# Patient Record
Sex: FEMALE | Race: WHITE | URBAN - METROPOLITAN AREA
[De-identification: names, ages, dates, MRNs, and addresses within clinical notes are randomized per-mention and may not be internally consistent; named-entity substitution may affect disease eponyms.]

---

## 2024-03-20 NOTE — PROGRESS NOTES
"Assessment/Plan:     No problem-specific Assessment & Plan notes found for this encounter.          Diagnoses and all orders for this visit:    Positive pregnancy test  -     Ambulatory Referral to Maternal Fetal Medicine; Future  -     HIV 1/2 AG/AB w Reflex [facility] for 2 yr old and above; Future  -     Hepatitis C antibody; Future  -     UA (URINE) with reflex to Scope; Future  -     Urine culture; Future  -     Hepatitis B surface antigen; Future  -     CBC and differential; Future  -     Rubella antibody, IgG; Future  -     Type and screen; Future  -     RPR-Syphilis Screening (Total Syphilis IGG/IGM); Future  -     Hepatitis B surface antibody; Future  -     Anemia Panel w/Reflex, OB; Future  -     Prenatal Vit-Fe Fumarate-FA (Prenatal Plus Vitamin/Mineral) 27-1 MG TABS; Take 1 tablet by mouth in the morning    RTO for OB intake.          Subjective:      Patient ID: Marybel Rivas is a 29 y.o. female who presents for dating and viability US. Her LMP was 23, c/w 12 wk 3 days, EDC 10/04/24.  She offers no complaints.  She states her first child  at age 3 1/2 years but could not elucidate the reason.        TV US reveals a single viable IUP  CRL  4.33 cm c/w 11 wk 1 day    Dating per US  EDC 10/13/24    HPI    The following portions of the patient's history were reviewed and updated as appropriate: allergies, current medications, past family history, past medical history, past social history, past surgical history and problem list.    Review of Systems      Objective:      /68 (BP Location: Left arm, Patient Position: Sitting, Cuff Size: Adult)   Pulse 83   Resp 18   Ht 4' 10\" (1.473 m)   Wt 55.8 kg (123 lb)   LMP 2023 (Exact Date)   BMI 25.71 kg/m²          Physical Exam  Vitals and nursing note reviewed. Exam conducted with a chaperone present.   Constitutional:       Appearance: Normal appearance.   Pulmonary:      Effort: Pulmonary effort is normal.   Neurological:      " General: No focal deficit present.      Mental Status: She is alert and oriented to person, place, and time.   Psychiatric:         Mood and Affect: Mood normal.         Behavior: Behavior normal.

## 2024-03-25 ENCOUNTER — OFFICE VISIT (OUTPATIENT)
Dept: OBGYN CLINIC | Facility: CLINIC | Age: 29
End: 2024-03-25

## 2024-03-25 ENCOUNTER — APPOINTMENT (OUTPATIENT)
Dept: LAB | Facility: CLINIC | Age: 29
End: 2024-03-25

## 2024-03-25 ENCOUNTER — TELEPHONE (OUTPATIENT)
Age: 29
End: 2024-03-25

## 2024-03-25 VITALS
WEIGHT: 123 LBS | RESPIRATION RATE: 18 BRPM | DIASTOLIC BLOOD PRESSURE: 68 MMHG | SYSTOLIC BLOOD PRESSURE: 110 MMHG | HEART RATE: 83 BPM | BODY MASS INDEX: 25.82 KG/M2 | HEIGHT: 58 IN

## 2024-03-25 DIAGNOSIS — Z32.01 POSITIVE PREGNANCY TEST: Primary | ICD-10-CM

## 2024-03-25 DIAGNOSIS — Z32.01 POSITIVE PREGNANCY TEST: ICD-10-CM

## 2024-03-25 LAB
ABO GROUP BLD: NORMAL
AMORPH URATE CRY URNS QL MICRO: ABNORMAL
BACTERIA UR QL AUTO: ABNORMAL /HPF
BASOPHILS # BLD AUTO: 0.03 THOUSANDS/ÂΜL (ref 0–0.1)
BASOPHILS NFR BLD AUTO: 0 % (ref 0–1)
BILIRUB UR QL STRIP: NEGATIVE
BLD GP AB SCN SERPL QL: NEGATIVE
CLARITY UR: ABNORMAL
COLOR UR: YELLOW
EOSINOPHIL # BLD AUTO: 0.04 THOUSAND/ÂΜL (ref 0–0.61)
EOSINOPHIL NFR BLD AUTO: 1 % (ref 0–6)
ERYTHROCYTE [DISTWIDTH] IN BLOOD BY AUTOMATED COUNT: 12.5 % (ref 11.6–15.1)
GLUCOSE UR STRIP-MCNC: NEGATIVE MG/DL
HBV SURFACE AB SER-ACNC: 110 MIU/ML
HBV SURFACE AG SER QL: NORMAL
HCT VFR BLD AUTO: 36.3 % (ref 34.8–46.1)
HCV AB SER QL: NORMAL
HGB BLD-MCNC: 12.2 G/DL (ref 11.5–15.4)
HGB UR QL STRIP.AUTO: NEGATIVE
HIV 1+2 AB+HIV1 P24 AG SERPL QL IA: NORMAL
HIV 2 AB SERPL QL IA: NORMAL
HIV1 AB SERPL QL IA: NORMAL
HIV1 P24 AG SERPL QL IA: NORMAL
IMM GRANULOCYTES # BLD AUTO: 0.03 THOUSAND/UL (ref 0–0.2)
IMM GRANULOCYTES NFR BLD AUTO: 0 % (ref 0–2)
KETONES UR STRIP-MCNC: NEGATIVE MG/DL
LEUKOCYTE ESTERASE UR QL STRIP: ABNORMAL
LYMPHOCYTES # BLD AUTO: 1.8 THOUSANDS/ÂΜL (ref 0.6–4.47)
LYMPHOCYTES NFR BLD AUTO: 22 % (ref 14–44)
MCH RBC QN AUTO: 28.8 PG (ref 26.8–34.3)
MCHC RBC AUTO-ENTMCNC: 33.6 G/DL (ref 31.4–37.4)
MCV RBC AUTO: 86 FL (ref 82–98)
MONOCYTES # BLD AUTO: 0.53 THOUSAND/ÂΜL (ref 0.17–1.22)
MONOCYTES NFR BLD AUTO: 6 % (ref 4–12)
MUCOUS THREADS UR QL AUTO: ABNORMAL
NEUTROPHILS # BLD AUTO: 5.86 THOUSANDS/ÂΜL (ref 1.85–7.62)
NEUTS SEG NFR BLD AUTO: 71 % (ref 43–75)
NITRITE UR QL STRIP: POSITIVE
NON-SQ EPI CELLS URNS QL MICRO: ABNORMAL /HPF
NRBC BLD AUTO-RTO: 0 /100 WBCS
PH UR STRIP.AUTO: 7.5 [PH]
PLATELET # BLD AUTO: 317 THOUSANDS/UL (ref 149–390)
PMV BLD AUTO: 11.1 FL (ref 8.9–12.7)
PROT UR STRIP-MCNC: ABNORMAL MG/DL
RBC # BLD AUTO: 4.24 MILLION/UL (ref 3.81–5.12)
RBC #/AREA URNS AUTO: ABNORMAL /HPF
RH BLD: POSITIVE
RUBV IGG SERPL IA-ACNC: 26.3 IU/ML
SP GR UR STRIP.AUTO: 1.02 (ref 1–1.03)
SPECIMEN EXPIRATION DATE: NORMAL
TREPONEMA PALLIDUM IGG+IGM AB [PRESENCE] IN SERUM OR PLASMA BY IMMUNOASSAY: NORMAL
UROBILINOGEN UR STRIP-ACNC: <2 MG/DL
WBC # BLD AUTO: 8.29 THOUSAND/UL (ref 4.31–10.16)
WBC #/AREA URNS AUTO: ABNORMAL /HPF

## 2024-03-25 PROCEDURE — 87340 HEPATITIS B SURFACE AG IA: CPT

## 2024-03-25 PROCEDURE — 99202 OFFICE O/P NEW SF 15 MIN: CPT | Performed by: OBSTETRICS & GYNECOLOGY

## 2024-03-25 PROCEDURE — 86900 BLOOD TYPING SEROLOGIC ABO: CPT

## 2024-03-25 PROCEDURE — 85025 COMPLETE CBC W/AUTO DIFF WBC: CPT

## 2024-03-25 PROCEDURE — 86803 HEPATITIS C AB TEST: CPT

## 2024-03-25 PROCEDURE — 86762 RUBELLA ANTIBODY: CPT

## 2024-03-25 PROCEDURE — 81001 URINALYSIS AUTO W/SCOPE: CPT

## 2024-03-25 PROCEDURE — 86780 TREPONEMA PALLIDUM: CPT

## 2024-03-25 PROCEDURE — 76817 TRANSVAGINAL US OBSTETRIC: CPT | Performed by: OBSTETRICS & GYNECOLOGY

## 2024-03-25 PROCEDURE — 86850 RBC ANTIBODY SCREEN: CPT

## 2024-03-25 PROCEDURE — 86706 HEP B SURFACE ANTIBODY: CPT

## 2024-03-25 PROCEDURE — 87389 HIV-1 AG W/HIV-1&-2 AB AG IA: CPT

## 2024-03-25 PROCEDURE — 86901 BLOOD TYPING SEROLOGIC RH(D): CPT

## 2024-03-25 PROCEDURE — 36415 COLL VENOUS BLD VENIPUNCTURE: CPT

## 2024-03-25 RX ORDER — VITAMIN A ACETATE, .BETA.-CAROTENE, ASCORBIC ACID, CHOLECALCIFEROL, .ALPHA.-TOCOPHEROL ACETATE, DL-, THIAMINE MONONITRATE, RIBOFLAVIN, NIACINAMIDE, PYRIDOXINE HYDROCHLORIDE, FOLIC ACID, CYANOCOBALAMIN, CALCIUM CARBONATE, FERROUS FUMARATE, ZINC OXIDE, CUPRIC OXIDE 9.9; 120; 920; 200; 400; 2; 12; 27; 1; 20; 10; 3; 1.84; 3080; 25 MG/1; MG/1; [IU]/1; MG/1; [IU]/1; MG/1; UG/1; MG/1; MG/1; MG/1; MG/1; MG/1; MG/1; [IU]/1; MG/1
1 TABLET, FILM COATED ORAL DAILY
Qty: 90 TABLET | Refills: 3 | Status: SHIPPED | OUTPATIENT
Start: 2024-03-25

## 2024-03-28 ENCOUNTER — INITIAL PRENATAL (OUTPATIENT)
Dept: OBGYN CLINIC | Facility: CLINIC | Age: 29
End: 2024-03-28

## 2024-03-28 DIAGNOSIS — Z34.91 PRENATAL CARE IN FIRST TRIMESTER: Primary | ICD-10-CM

## 2024-03-28 PROCEDURE — 99211 OFF/OP EST MAY X REQ PHY/QHP: CPT

## 2024-03-28 NOTE — LETTER
[facility] Letter    Marybel Rivas  1995  [address]  [place] [address]       03/28/24          Marybel Rivas is a patient and under our care in our office. Marybel Rivas's Estimated Date of Delivery: 10/13/24.  Any questions or concerns feel free to contact our office.     Thank you,    Critical access hospital OB/GYN      [facility]/[place]  [phone]  [facility]/[place]  [phone]    [facility]/[place]  [phone]   [facility]  [phone]    [facility]   [phone]    [facility]  [phone]

## 2024-03-28 NOTE — LETTER
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    We recommend that all pregnant women:    1. Have a well-ventilated workspace.  2. Wear low-heeled shoes.  3. Work no more than 40 hours per week.  4. Have a 15 minute break every 2 hours and at least 30 minutes for a meal break.   5. Use good body mechanics by bending at your knees to avoid back strain and lift no more than 20 pounds without assistance. Will need assistance with lifting over 20 lbs.   6. Have ready access to bathrooms and water.      She may continue to work until her due date unless medical complications arise. We anticipate she may return to work in 6-8 weeks after delivery.     Sincerely,    [facility]  [address] [place] [address] [phone]

## 2024-03-28 NOTE — LETTER
Proof of Pregnancy Letter    Marybel Rivas  1995  [address]  [place] [address]        03/28/24      Marybel Rivas is a patient at our facility. Marybel Rivas Estimated Date of Delivery: 10/13/24       Any questions or concerns, please feel free to contact our office.    Sincerely,     [facility]   [address], [place] [address] [phone]

## 2024-03-28 NOTE — PROGRESS NOTES
OB INTAKE INTERVIEW  Pt presents for OB intake.     OB History    Para Term  AB Living   2 1 1     0   SAB IAB Ectopic Multiple Live Births           1      # Outcome Date GA Lbr Danny/2nd Weight Sex Delivery Anes PTL Lv   2 Current            1 Term     F Vag-Spont   DEC     Hx of  delivery prior to 36 weeks 6 days:  No   If yes, place a referral for cervical surveillance at 16 weeks.     Last Menstrual Period:    Patient's last menstrual period was 2023 (exact date).     Ultrasound date: 2024  11 weeks 4 days     Estimated Date of Delivery: 10/13/24   Confirmed by LMP or US    H&P visit scheduled. 2024      Last pap smear: unknown date    Findings; lab pap smear results: no abnormalities    Current Issues:  Constipation :   No  Headaches :   No  Cramping:  No  Spotting :   No  PICA cravings :  No    FOB Involved:   Yes  Planned pregnancy:  Yes    I have these concerns about this prenatal patient:   Intake done with use of [employer]  line.  Patient referral in for MFM but patient has not yet scheduled.  Encouraged to complete. Patient had previous daughter at age 4 that is  but patient reports her death was 'natural' and unable to give reason.    Interview education    Baby and Me Handout  [facility] Handout  [facility] Handout  Discussed genetic testing  Prenatal lab work: Scripts printed and given to pt.   Influenza vaccine given today: No  Discussed Tdap vaccine.   Immunizations:   There is no immunization history on file for this patient.  Depression Screening Follow-up Plan: Patient's depression screening was negative with an Hagerman score of  6  Clinically patient does not have depression. No treatment is required..          Infection Screening: Does the pt have a hx of MRSA? No  If yes- please follow MRSA protocol and obtain a nasal swab for MRSA culture    The patient was oriented to our practice and all questions were  answered.  Interviewed by: Laura Hough, KO 03/28/24

## 2024-04-04 ENCOUNTER — PATIENT OUTREACH (OUTPATIENT)
Dept: OBGYN CLINIC | Facility: CLINIC | Age: 29
End: 2024-04-04

## 2024-04-04 ENCOUNTER — ROUTINE PRENATAL (OUTPATIENT)
Dept: OBGYN CLINIC | Facility: CLINIC | Age: 29
End: 2024-04-04

## 2024-04-04 VITALS
HEIGHT: 58 IN | BODY MASS INDEX: 26.03 KG/M2 | RESPIRATION RATE: 18 BRPM | SYSTOLIC BLOOD PRESSURE: 104 MMHG | HEART RATE: 88 BPM | WEIGHT: 124 LBS | DIASTOLIC BLOOD PRESSURE: 71 MMHG

## 2024-04-04 DIAGNOSIS — O21.9 NAUSEA AND VOMITING IN PREGNANCY: ICD-10-CM

## 2024-04-04 DIAGNOSIS — Z20.2 POSSIBLE EXPOSURE TO STD: ICD-10-CM

## 2024-04-04 DIAGNOSIS — Z12.4 ENCOUNTER FOR PAPANICOLAOU SMEAR OF CERVIX: Primary | ICD-10-CM

## 2024-04-04 PROBLEM — Z3A.12 12 WEEKS GESTATION OF PREGNANCY: Status: ACTIVE | Noted: 2024-04-04

## 2024-04-04 PROCEDURE — 87591 N.GONORRHOEAE DNA AMP PROB: CPT

## 2024-04-04 PROCEDURE — 87491 CHLMYD TRACH DNA AMP PROBE: CPT

## 2024-04-04 PROCEDURE — 99213 OFFICE O/P EST LOW 20 MIN: CPT | Performed by: OBSTETRICS & GYNECOLOGY

## 2024-04-04 PROCEDURE — G0145 SCR C/V CYTO,THINLAYER,RESCR: HCPCS

## 2024-04-04 RX ORDER — PYRIDOXINE HCL (VITAMIN B6) 25 MG
25 TABLET ORAL DAILY
Qty: 30 TABLET | Refills: 0 | Status: SHIPPED | OUTPATIENT
Start: 2024-04-04 | End: 2024-05-04

## 2024-04-04 NOTE — PROGRESS NOTES
OB/GYN  PRENATAL H&P VISIT  Marybel Rivas  2024  3:06 PM  Dr. Latisha Sadler MD      SUBJECTIVE  Marybel Rivas is a 29 y.o.  at 12w4d here for initial prenatal H&P.     She is currently doing well. She denies vaginal bleeding, cramping, leakage, abnormal discharge.   Patient does endorse some nausea and heart burn. We discussed the use of      Past Medical History:  - Denies    Past Surgical History:  - denies    Social History:  - Partner's name: Wali, involved: yes  - She does not currently work.   - She denies hx of STD/STI, denies a hx of TB or close contacts with persons with TB. She has never had MRSA.   - She denies use of nicotine or recreational drug use. She denies use of ETOH.    Family History:  - She denies a family history of inheritable conditions such as physical or intellectual disabilities, birth defects, blood disorders, heart or neural tube defects.     OB History    Para Term  AB Living   2 1 1 0 0 0   SAB IAB Ectopic Multiple Live Births   0 0 0 0 1      # Outcome Date GA Lbr Danny/2nd Weight Sex Delivery Anes PTL Lv   2 Current            1 Term 2012    F Vag-Spont   DEC         History reviewed. No pertinent past medical history.    History reviewed. No pertinent surgical history.    Social History     Socioeconomic History    Marital status: Legally      Spouse name: Not on file    Number of children: Not on file    Years of education: Not on file    Highest education level: Not on file   Occupational History    Not on file   Tobacco Use    Smoking status: Never    Smokeless tobacco: Never   Vaping Use    Vaping status: Never Used   Substance and Sexual Activity    Alcohol use: Not Currently    Drug use: Never    Sexual activity: Yes     Partners: Male     Birth control/protection: None   Other Topics Concern    Not on file   Social History Narrative    Not on file     Social Determinants of Health     Financial Resource Strain: Low Risk  (3/28/2024)     "Overall Financial Resource Strain (CARDIA)     Difficulty of Paying Living Expenses: Not very hard   Food Insecurity: No Food Insecurity (3/28/2024)    Hunger Vital Sign     Worried About Running Out of Food in the Last Year: Never true     Ran Out of Food in the Last Year: Never true   Transportation Needs: No Transportation Needs (3/28/2024)    PRAPARE - Transportation     Lack of Transportation (Medical): No     Lack of Transportation (Non-Medical): No   Physical Activity: Not on file   Stress: Not on file   Social Connections: Not on file   Intimate Partner Violence: Not At Risk (3/28/2024)    Humiliation, Afraid, Rape, and Kick questionnaire     Fear of Current or Ex-Partner: No     Emotionally Abused: No     Physically Abused: No     Sexually Abused: No   Housing Stability: Low Risk  (3/28/2024)    Housing Stability Vital Sign     Unable to Pay for Housing in the Last Year: No     Number of Places Lived in the Last Year: 1     Unstable Housing in the Last Year: No       OBJECTIVE  Vitals:    24 1349   BP: 104/71   Pulse: 88   Resp: 18       General: Well appearing, no distress  Respiratory: Unlabored breathing  Cardiovascular: Regular rate.  Abdomen: Soft, gravid, nontender  Fundal Height: Appropriate for gestational age.  Extremities: Warm and well perfused.  Non tender.    ASSESSMENT AND PLAN    29 y.o., , with /71 (BP Location: Left arm, Patient Position: Sitting, Cuff Size: Adult)   Pulse 88   Resp 18   Ht 4' 10\" (1.473 m)   Wt 56.2 kg (124 lb)   LMP 2023 (Exact Date) , at 12w4d here for her prenatal H&P.    Pregnancy: H&P completed today. PN Labs reviewed today.  Labor expectations discussed with patient, including appointment schedule, nutrition, exercise, medications, sexual intercourse, and nausea/vomiting.     FHR: 152bpm by doppler    Overview:    Labs  Pap smear collected today  and GC/Ct collected today  Prenatal panelreviewed and wnl  28w labs to be " ordered  Vaccines:    Tdap vaccine: offer at 28w  Contraception: undecided  Feeding plan: breast  Birth plan:  possibly with epidural  Delivery consent: to be signed at 28w  Ultrasounds: Level 1 scheduled for 4/10    Screening: Pap smear . GC/CT collected.   Problem List          Digestive    Nausea and vomiting in pregnancy    Current Assessment & Plan     Patient endorses occasional nausea and acid reflux  Patient encouraged to try TUMS and B6 sent to pharmacy            Obstetrics/Gynecology    12 weeks gestation of pregnancy    Overview     Labs  Pap smear collected today  and GC/Ct collected today  Prenatal panelreviewed and wnl  28w labs to be ordered  Vaccines:    Tdap vaccine: offer at 28w  Contraception: undecided  Feeding plan: breast  Birth plan:  possibly with epidural  Delivery consent: to be signed at 28w  Ultrasounds: Level 1 scheduled for 4/10         Current Assessment & Plan     Pap/ GC/CT collected today  RTO in 4 weeks          Other Visit Diagnoses       Encounter for Papanicolaou smear of cervix    -  Primary    Possible exposure to STD                Latisha Sadler MD  2024  3:06 PM

## 2024-04-04 NOTE — ASSESSMENT & PLAN NOTE
Patient endorses occasional nausea and acid reflux  Patient encouraged to try TUMS and B6 sent to pharmacy

## 2024-04-04 NOTE — PROGRESS NOTES
RICHELLE RIOS was referred to complete a PN SW assessment. Pt is 29 y.o.  single  female. Pt GA is 12w4d and  Estimated Date of Delivery: 10/13/24. Pt primary language is Egyptian.     Pt reports that this was an unplanned but welcomed pregnancy. PT reports that she is happy. PT reports that FOB is supportive and that he is happy. PT is here with her close friend who is also supportive.     PT denies MH concerns at this time. PT denies JAIN concerns.     PT denied food insecurity but did reports that she has been receiving food assistance with a [employer].    PT reports concern for baby supplies. PT requested to receive supply resources at a later time in pregnancy.     RICHELLE RIOS has placed PT under SW surveillance and will f/u with PT at June visit.     RICHELLE RIOS spoke with Provider of PT. Provider informed RICHELLE RIOS that PT lost a child when the child was just 5yo.

## 2024-04-06 LAB
C TRACH DNA SPEC QL NAA+PROBE: NEGATIVE
N GONORRHOEA DNA SPEC QL NAA+PROBE: NEGATIVE

## 2024-04-10 ENCOUNTER — ROUTINE PRENATAL (OUTPATIENT)
Facility: HOSPITAL | Age: 29
End: 2024-04-10
Attending: OBSTETRICS & GYNECOLOGY

## 2024-04-10 VITALS
HEART RATE: 87 BPM | HEIGHT: 58 IN | SYSTOLIC BLOOD PRESSURE: 112 MMHG | DIASTOLIC BLOOD PRESSURE: 64 MMHG | WEIGHT: 126.1 LBS | BODY MASS INDEX: 26.47 KG/M2

## 2024-04-10 DIAGNOSIS — Z3A.13 13 WEEKS GESTATION OF PREGNANCY: ICD-10-CM

## 2024-04-10 DIAGNOSIS — Z36.82 ENCOUNTER FOR ANTENATAL SCREENING FOR NUCHAL TRANSLUCENCY: Primary | ICD-10-CM

## 2024-04-10 DIAGNOSIS — Z32.01 POSITIVE PREGNANCY TEST: ICD-10-CM

## 2024-04-10 PROCEDURE — 76801 OB US < 14 WKS SINGLE FETUS: CPT | Performed by: OBSTETRICS & GYNECOLOGY

## 2024-04-10 PROCEDURE — 76813 OB US NUCHAL MEAS 1 GEST: CPT | Performed by: OBSTETRICS & GYNECOLOGY

## 2024-04-10 RX ORDER — ASPIRIN 81 MG/1
162 TABLET, CHEWABLE ORAL
Qty: 180 TABLET | Refills: 1 | Status: SHIPPED | OUTPATIENT
Start: 2024-04-10 | End: 2024-07-09

## 2024-04-10 NOTE — LETTER
April 10, 2024     [facility] PROVIDER    Patient: Marybel Rivas   YOB: 1995   Date of Visit: 4/10/2024       Dear  Provider:    Thank you for referring Marybel Rivas to me for evaluation. Below are my notes for this consultation.    If you have questions, please do not hesitate to call me. I look forward to following your patient along with you.         Sincerely,        Donaldo Mejia MD        CC: No Recipients    Donaldo Mejia MD  4/10/2024  8:20 AM  Sign when Signing Visit  Please refer to the [facility] ultrasound report in Ob Procedures for additional information regarding today's visit

## 2024-04-10 NOTE — PROGRESS NOTES
Please refer to the [facility] ultrasound report in Ob Procedures for additional information regarding today's visit

## 2024-04-11 LAB
LAB AP GYN PRIMARY INTERPRETATION: NORMAL
LAB AP LMP: NORMAL
Lab: NORMAL

## 2024-04-17 ENCOUNTER — TELEPHONE (OUTPATIENT)
Dept: OBGYN CLINIC | Facility: CLINIC | Age: 29
End: 2024-04-17

## 2024-04-17 NOTE — TELEPHONE ENCOUNTER
ID [id number]    Called and spoke to patient, informed her of the test results. Patient verbalized understanding, no questions or concerns.

## 2024-04-17 NOTE — TELEPHONE ENCOUNTER
----- Message from Latisha Sadler MD sent at 4/17/2024  3:57 PM EDT -----  Hi, can someone please give Cherelle a call and let her know the testing we did was all negative/normal.   Thanks!  -Janae

## 2024-05-02 ENCOUNTER — ROUTINE PRENATAL (OUTPATIENT)
Dept: OBGYN CLINIC | Facility: CLINIC | Age: 29
End: 2024-05-02

## 2024-05-02 VITALS
HEART RATE: 72 BPM | SYSTOLIC BLOOD PRESSURE: 97 MMHG | BODY MASS INDEX: 26.24 KG/M2 | WEIGHT: 125 LBS | RESPIRATION RATE: 18 BRPM | DIASTOLIC BLOOD PRESSURE: 62 MMHG | HEIGHT: 58 IN

## 2024-05-02 DIAGNOSIS — Z60.3 LANGUAGE BARRIER: ICD-10-CM

## 2024-05-02 DIAGNOSIS — Z34.92 SECOND TRIMESTER PREGNANCY: Primary | ICD-10-CM

## 2024-05-02 DIAGNOSIS — Z75.8 LANGUAGE BARRIER: ICD-10-CM

## 2024-05-02 DIAGNOSIS — Z3A.16 16 WEEKS GESTATION OF PREGNANCY: ICD-10-CM

## 2024-05-02 PROCEDURE — 99213 OFFICE O/P EST LOW 20 MIN: CPT | Performed by: NURSE PRACTITIONER

## 2024-05-02 SDOH — SOCIAL STABILITY - SOCIAL INSECURITY: ACCULTURATION DIFFICULTY: Z60.3

## 2024-05-02 NOTE — PROGRESS NOTES
[facility]  OB/GYN prenatal visit    S: 29 y.o.  16w4d here for PN visit. She has no obstetric complaints, including pelvic pain, contractions, vaginal bleeding, loss of fluid,  used  [id number]  O:  Vitals:    24 1446   BP: 97/62   Pulse: 72   Resp: 18       Gen: no acute distress, nonlabored breathing  Fundal Height (cm): 16 cm  Fetal Heart Rate: 158    A/P:      IUP at 16w4d  No obstetric complaints today  US 4/10/24 , level 2 scheduled for 24  LDASA 162 mg daily till 36 weeks, first-degree relative with preeclampsia, long interval pregnancyindication  MSAFP - reviewed pt desires  Contraception: undecided  Breastfeeding: y  Discussed  labor precautions and fetal kick counts    Return to clinic in 4 weeks    Problem List          Digestive    Nausea and vomiting in pregnancy       Care Coordination    Language barrier       Obstetrics/Gynecology    16 weeks gestation of pregnancy    Overview     Labs  Pap smear collected today- negative   and GC/Ct -negative  Prenatal panelreviewed and wnl  28w labs to be ordered  LDASA 2 mg daily until 36 weeks, first-degree relative with preeclampsia, long interval pregnancy indication  Declined NIPT  MSAFP [ pt to complete ]   Vaccines: flu vaccine- no  Covid vaccines- none  Tdap vaccine: offer at 28w  Contraception: undecided  Feeding plan: breast  Birth plan:  possibly with epidural  Delivery consent: to be signed at 28w  Ultrasounds: Level 1 scheduled for 4/10/24, level 2 on  24         Second trimester pregnancy          ADIEL Barcenas  2024  3:03 PM

## 2024-05-16 ENCOUNTER — APPOINTMENT (OUTPATIENT)
Dept: LAB | Facility: CLINIC | Age: 29
End: 2024-05-16

## 2024-05-16 DIAGNOSIS — Z34.92 SECOND TRIMESTER PREGNANCY: ICD-10-CM

## 2024-05-16 DIAGNOSIS — Z3A.16 16 WEEKS GESTATION OF PREGNANCY: ICD-10-CM

## 2024-05-16 PROCEDURE — 82105 ALPHA-FETOPROTEIN SERUM: CPT

## 2024-05-16 PROCEDURE — 36415 COLL VENOUS BLD VENIPUNCTURE: CPT

## 2024-05-18 LAB
2ND TRIMESTER 4 SCREEN SERPL-IMP: NORMAL
AFP ADJ MOM SERPL: 0.93
AFP INTERP AMN-IMP: NORMAL
AFP INTERP SERPL-IMP: NORMAL
AFP INTERP SERPL-IMP: NORMAL
AFP SERPL-MCNC: 50 NG/ML
AGE AT DELIVERY: 28.5 YR
GA METHOD: NORMAL
GA: 18.6 WEEKS
IDDM PATIENT QL: NO
MULTIPLE PREGNANCY: NO
NEURAL TUBE DEFECT RISK FETUS: NORMAL %

## 2024-05-20 ENCOUNTER — TELEPHONE (OUTPATIENT)
Dept: OBGYN CLINIC | Facility: CLINIC | Age: 29
End: 2024-05-20

## 2024-05-20 NOTE — TELEPHONE ENCOUNTER
----- Message from ADIEL Xie sent at 5/20/2024  5:41 PM EDT -----  Please call patient to inform that her MSAFP test, screen for spina bifida/neural tube defects was negative.  Thank you

## 2024-05-30 ENCOUNTER — ROUTINE PRENATAL (OUTPATIENT)
Facility: HOSPITAL | Age: 29
End: 2024-05-30

## 2024-05-30 ENCOUNTER — ROUTINE PRENATAL (OUTPATIENT)
Dept: OBGYN CLINIC | Facility: CLINIC | Age: 29
End: 2024-05-30

## 2024-05-30 VITALS
DIASTOLIC BLOOD PRESSURE: 58 MMHG | SYSTOLIC BLOOD PRESSURE: 108 MMHG | BODY MASS INDEX: 26.53 KG/M2 | HEIGHT: 58 IN | HEART RATE: 112 BPM | WEIGHT: 126.4 LBS

## 2024-05-30 VITALS
RESPIRATION RATE: 16 BRPM | BODY MASS INDEX: 26.5 KG/M2 | WEIGHT: 126.8 LBS | DIASTOLIC BLOOD PRESSURE: 62 MMHG | HEART RATE: 100 BPM | SYSTOLIC BLOOD PRESSURE: 100 MMHG

## 2024-05-30 DIAGNOSIS — Z34.92 SECOND TRIMESTER PREGNANCY: ICD-10-CM

## 2024-05-30 DIAGNOSIS — Z36.3 ENCOUNTER FOR ANTENATAL SCREENING FOR MALFORMATIONS: ICD-10-CM

## 2024-05-30 DIAGNOSIS — Z3A.20 20 WEEKS GESTATION OF PREGNANCY: Primary | ICD-10-CM

## 2024-05-30 DIAGNOSIS — Z36.86 ENCOUNTER FOR ANTENATAL SCREENING FOR CERVICAL LENGTH: ICD-10-CM

## 2024-05-30 DIAGNOSIS — Z3A.13 13 WEEKS GESTATION OF PREGNANCY: ICD-10-CM

## 2024-05-30 PROBLEM — O21.9 NAUSEA AND VOMITING IN PREGNANCY: Status: RESOLVED | Noted: 2024-04-04 | Resolved: 2024-05-30

## 2024-05-30 PROCEDURE — 76817 TRANSVAGINAL US OBSTETRIC: CPT | Performed by: OBSTETRICS & GYNECOLOGY

## 2024-05-30 PROCEDURE — 76805 OB US >/= 14 WKS SNGL FETUS: CPT | Performed by: OBSTETRICS & GYNECOLOGY

## 2024-05-30 PROCEDURE — PNV: Performed by: OBSTETRICS & GYNECOLOGY

## 2024-05-30 RX ORDER — ASPIRIN 81 MG/1
162 TABLET, CHEWABLE ORAL
Qty: 180 TABLET | Refills: 1 | Status: SHIPPED | OUTPATIENT
Start: 2024-05-30 | End: 2024-11-26

## 2024-05-30 NOTE — PROGRESS NOTES
[facility] OBGYN  PRENATAL VISIT  Name: Marybel Rivas  MRN: [id number]  : 1995      ASSESSMENT/PLAN:  Problem List       20 weeks gestation of pregnancy    Overview     Labs  Pap smear collected today- negative   and GC/Ct -negative  Prenatal panelreviewed and wnl  28w labs to be ordered  LDASA 2 mg daily until 36 weeks, first-degree relative with preeclampsia, long interval pregnancy indication  Declined NIPT  MSAFP neg  Vaccines: flu vaccine- no  Covid vaccines- none  Tdap vaccine: offer at 28w  Contraception: undecided  Feeding plan: breast  Birth plan:  possibly with epidural  Delivery consent: to be signed at 28w  Ultrasounds:level II scheduled for 24         Second trimester pregnancy    Language barrier     Other Visit Diagnoses       13 weeks gestation of pregnancy                  SUBJECTIVE 29 y.o.  20w4d here for PN visit. She denies contractions. She denies leakage of fluid and vaginal bleeding.     OBJECTIVE:  Vitals:    24 0806   BP: 100/62   Pulse: 100   Resp: 16       Physical Exam    FHT: 150 bpm       Future Appointments   Date Time Provider Department Center   2024 10:45 AM  US 1 [facility]         Liana Bhakta MD  OB/GYN PGY-4  2024  8:18 AM

## 2024-05-30 NOTE — LETTER
May 30, 2024     [facility] PROVIDER    Patient: Marybel Rivas   YOB: 1995   Date of Visit: 5/30/2024       Dear  Provider:    Thank you for referring Marybel Rivas to me for evaluation. Below are my notes for this consultation.    If you have questions, please do not hesitate to call me. I look forward to following your patient along with you.         Sincerely,        Donaldo Mejia MD        CC: No Recipients    Donaldo Mejia MD  5/30/2024 10:57 AM  Sign when Signing Visit  Please refer to the [facility] ultrasound report in Ob Procedures for additional information regarding today's visit

## 2024-06-27 ENCOUNTER — ROUTINE PRENATAL (OUTPATIENT)
Dept: OBGYN CLINIC | Facility: CLINIC | Age: 29
End: 2024-06-27

## 2024-06-27 ENCOUNTER — PATIENT OUTREACH (OUTPATIENT)
Dept: OBGYN CLINIC | Facility: CLINIC | Age: 29
End: 2024-06-27

## 2024-06-27 VITALS
RESPIRATION RATE: 18 BRPM | HEIGHT: 58 IN | WEIGHT: 128 LBS | DIASTOLIC BLOOD PRESSURE: 65 MMHG | SYSTOLIC BLOOD PRESSURE: 101 MMHG | HEART RATE: 87 BPM | BODY MASS INDEX: 26.87 KG/M2

## 2024-06-27 DIAGNOSIS — Z75.8 LANGUAGE BARRIER: ICD-10-CM

## 2024-06-27 DIAGNOSIS — Z34.92 SECOND TRIMESTER PREGNANCY: Primary | ICD-10-CM

## 2024-06-27 DIAGNOSIS — Z3A.24 24 WEEKS GESTATION OF PREGNANCY: ICD-10-CM

## 2024-06-27 DIAGNOSIS — Z60.3 LANGUAGE BARRIER: ICD-10-CM

## 2024-06-27 PROCEDURE — 99213 OFFICE O/P EST LOW 20 MIN: CPT | Performed by: NURSE PRACTITIONER

## 2024-06-27 SDOH — SOCIAL STABILITY - SOCIAL INSECURITY: ACCULTURATION DIFFICULTY: Z60.3

## 2024-06-27 NOTE — PROGRESS NOTES
[facility]  OB/GYN prenatal visit    S: 29 y.o.  24w4d here for PN visit. She has no obstetric complaints, including pelvic pain, contractions, vaginal bleeding, loss of fluid, or decreased fetal movement.    [id number] used  O:  Vitals:    24 0826   BP: 101/65   Pulse: 87   Resp: 18       Gen: no acute distress, nonlabored breathing  Fundal Height (cm): 24 cm  Fetal Heart Rate: 155    A/P:      IUP at 24w4d  No obstetric complaints today   MSAFP neg,   US 24 growth normal, no further ultrasound  To get her 28-week labs done a few days prior to her next appointment in 4 weeks, O+.  Taking her LDASA daily  Contraception: Nexplanon reviewed with pt  Breastfeeding: Y  Birth plan:    Discussed  labor precautions and fetal kick counts    Return to clinic in 4 weeks    Problem List          Care Coordination    Language barrier       Obstetrics/Gynecology    24 weeks gestation of pregnancy    Overview     Labs  Pap smear collected today- negative   and GC/Ct -negative  Prenatal panelreviewed and wnl  28w labs to be ordered  LDASA 2 mg daily until 36 weeks, first-degree relative with preeclampsia, long interval pregnancy indication  Declined NIPT  MSAFP neg  Vaccines: flu vaccine- no  Covid vaccines- none  Tdap vaccine: offer at 28w  Contraception: undecided  Feeding plan: breast  Birth plan:  possibly with epidural  Delivery consent: to be signed at 28w  Ultrasounds:level II scheduled for 24         Second trimester pregnancy              ADIEL Barcenas  2024  8:39 AM

## 2024-06-27 NOTE — PROGRESS NOTES
RICHELLE RIOS met with pt to f/u using  services ID [id number].     Pt reports that she is having a baby girl and is still interested in supply resources. RICHELLE RIOS filled out referral for crib donation and [facility] for supplies. RICHELLE RIOS will f/u with any additional application needed.     RICHELLE RIOS f/u regarding pt MH. Pt became emotional by getting teary eyes and slow pace talking. Pt expressed history of child loss over 10 years ago. Pt reports this is her first pregnancy since the loss of her first daughter in [place]. RICHELLE RIOS provided supportive listening. RICHELLE RIOS encouraged pt to contact RICHELLE RIOS for additional emotional support to pt if needed. RICHELLE RIOS provided contact card.     IRCHELLE RIOS will f/u with pt at next visit.

## 2024-07-23 ENCOUNTER — APPOINTMENT (OUTPATIENT)
Dept: LAB | Facility: CLINIC | Age: 29
End: 2024-07-23

## 2024-07-23 DIAGNOSIS — Z3A.24 24 WEEKS GESTATION OF PREGNANCY: ICD-10-CM

## 2024-07-23 LAB
BASOPHILS # BLD AUTO: 0.04 THOUSANDS/ÂΜL (ref 0–0.1)
BASOPHILS NFR BLD AUTO: 1 % (ref 0–1)
EOSINOPHIL # BLD AUTO: 0.03 THOUSAND/ÂΜL (ref 0–0.61)
EOSINOPHIL NFR BLD AUTO: 0 % (ref 0–6)
ERYTHROCYTE [DISTWIDTH] IN BLOOD BY AUTOMATED COUNT: 13.1 % (ref 11.6–15.1)
GLUCOSE 1H P 50 G GLC PO SERPL-MCNC: 151 MG/DL (ref 70–134)
HCT VFR BLD AUTO: 34.2 % (ref 34.8–46.1)
HGB BLD-MCNC: 11 G/DL (ref 11.5–15.4)
IMM GRANULOCYTES # BLD AUTO: 0.1 THOUSAND/UL (ref 0–0.2)
IMM GRANULOCYTES NFR BLD AUTO: 1 % (ref 0–2)
LYMPHOCYTES # BLD AUTO: 1.86 THOUSANDS/ÂΜL (ref 0.6–4.47)
LYMPHOCYTES NFR BLD AUTO: 25 % (ref 14–44)
MCH RBC QN AUTO: 28.1 PG (ref 26.8–34.3)
MCHC RBC AUTO-ENTMCNC: 32.2 G/DL (ref 31.4–37.4)
MCV RBC AUTO: 87 FL (ref 82–98)
MONOCYTES # BLD AUTO: 0.39 THOUSAND/ÂΜL (ref 0.17–1.22)
MONOCYTES NFR BLD AUTO: 5 % (ref 4–12)
NEUTROPHILS # BLD AUTO: 5.17 THOUSANDS/ÂΜL (ref 1.85–7.62)
NEUTS SEG NFR BLD AUTO: 68 % (ref 43–75)
NRBC BLD AUTO-RTO: 0 /100 WBCS
PLATELET # BLD AUTO: 258 THOUSANDS/UL (ref 149–390)
PMV BLD AUTO: 11.2 FL (ref 8.9–12.7)
RBC # BLD AUTO: 3.92 MILLION/UL (ref 3.81–5.12)
TREPONEMA PALLIDUM IGG+IGM AB [PRESENCE] IN SERUM OR PLASMA BY IMMUNOASSAY: NORMAL
WBC # BLD AUTO: 7.59 THOUSAND/UL (ref 4.31–10.16)

## 2024-07-23 PROCEDURE — 36415 COLL VENOUS BLD VENIPUNCTURE: CPT

## 2024-07-23 PROCEDURE — 86780 TREPONEMA PALLIDUM: CPT

## 2024-07-24 LAB — BACTERIA UR CULT: NORMAL

## 2024-07-24 NOTE — PROGRESS NOTES
[facility]  OB/GYN prenatal visit    S: 29 y.o.  28w3d here for PN visit. She has no obstetric complaints, including pelvic pain, contractions, vaginal bleeding, loss of fluid, or decreased fetal movement.    [id number] used   O:  Vitals:    24 0829   BP: 104/65   Pulse: 90   Resp: 18       Gen: no acute distress, nonlabored breathing  Fundal Height (cm): 28 cm  Fetal Heart Rate: 156    A/P:      IUP at 28w3d  No obstetric complaints today  US 24, breech, no further US  LDASA 162 mg daily  ! Hr , needs to complete fasting 3 hr GTT, hgb 11.0  Vaccinations: Tdap desires to get next appt.   Delivery consent signed  Contraception: undecided  Breastfeeding: Y  Birth plan:  undecided about epidural  Discussed  labor precautions and fetal kick counts    Return to clinic in 2 weeks    Problem List          Endocrine    Abnormal glucose tolerance in pregnancy       Care Coordination    Language barrier       Obstetrics/Gynecology    24 weeks gestation of pregnancy    Overview     Labs  Pap smear collected today- negative   and GC/Ct -negative  Prenatal panelreviewed and wnl  28w labs to be ordered, 1 hr , to complete a fasting 3-hour GTT, hgb was 11.0  LDASA 162 mg daily until 36 weeks, first-degree relative with preeclampsia, long interval pregnancy indication  Declined NIPT  MSAFP neg  Vaccines: flu vaccine- no  Covid vaccines- none  Tdap vaccine: wants to get next appt.   Contraception: undecided  Feeding plan: breast  Birth plan:  possibly with epidural  Delivery consent: signed 24  Ultrasounds:level II scheduled for 24- breech, no further US         Second trimester pregnancy          ADIEL Barcenas  2024  9:08 AM

## 2024-07-25 ENCOUNTER — ROUTINE PRENATAL (OUTPATIENT)
Dept: OBGYN CLINIC | Facility: CLINIC | Age: 29
End: 2024-07-25

## 2024-07-25 ENCOUNTER — PATIENT OUTREACH (OUTPATIENT)
Dept: OBGYN CLINIC | Facility: CLINIC | Age: 29
End: 2024-07-25

## 2024-07-25 VITALS
SYSTOLIC BLOOD PRESSURE: 104 MMHG | DIASTOLIC BLOOD PRESSURE: 65 MMHG | BODY MASS INDEX: 27.92 KG/M2 | WEIGHT: 133 LBS | HEART RATE: 90 BPM | RESPIRATION RATE: 18 BRPM | HEIGHT: 58 IN

## 2024-07-25 DIAGNOSIS — O99.810 ABNORMAL GLUCOSE TOLERANCE IN PREGNANCY: Primary | ICD-10-CM

## 2024-07-25 DIAGNOSIS — Z60.3 LANGUAGE BARRIER: ICD-10-CM

## 2024-07-25 DIAGNOSIS — Z3A.24 24 WEEKS GESTATION OF PREGNANCY: ICD-10-CM

## 2024-07-25 DIAGNOSIS — Z75.8 LANGUAGE BARRIER: ICD-10-CM

## 2024-07-25 PROCEDURE — 99213 OFFICE O/P EST LOW 20 MIN: CPT | Performed by: NURSE PRACTITIONER

## 2024-07-25 SDOH — SOCIAL STABILITY - SOCIAL INSECURITY: ACCULTURATION DIFFICULTY: Z60.3

## 2024-07-25 NOTE — PROGRESS NOTES
"RICHELLE RIOS used  service ID [id number] to contact pt.     RICHELLE RIOS f/u regarding pt connecting with [facility] resource. Pt reports \" I did not contact anyone because I have no way to find these people or call them\" RICHELLE RIOS reminded pt that RICHELLE RIOS provided pt with resource information at previous meeting. Pt denies receiving additional information. Pt continued stating \"Oh the text messages of phone numbers you sent me I never got the text\" Pt reports \"I thought you would contact everyone for me\" RICHELLE RIOS went over responsibility of pt contacting resources. RICHELLE RIOS attempted to provide pt with contact information for [employer] but pt denied taking the information. Pt requested to return to office in person to discuss information.     Pt arrived in person. RICHELLE RIOS educated pt regarding [facility] pregnancy center and provided pt with text information for the center. RICHELLE RIOS submitted cribs for kids application for pt. RICHELLE RIOS will f/u with pt end of August regarding receiving crib and car seat voucher.     "

## 2024-07-25 NOTE — PROGRESS NOTES
28 week education packet provided to patient on 07/25/24.    Included in packet:  Third Trimester paperwork  Delivery consent   Birthing room support person rules and acknowledgment  Birth Plan   Welcome information  Birth certificate worksheet   Consent for Photographers  Perineal/ Vaginal massage   Pediatric practices and locations       Breast pump was not ordered - patient is self pay

## 2024-08-06 ENCOUNTER — APPOINTMENT (OUTPATIENT)
Dept: LAB | Facility: CLINIC | Age: 29
End: 2024-08-06

## 2024-08-06 DIAGNOSIS — O99.810 ABNORMAL GLUCOSE TOLERANCE IN PREGNANCY: ICD-10-CM

## 2024-08-06 LAB
GLUCOSE 1H P 100 G GLC PO SERPL-MCNC: 84 MG/DL (ref 65–179)
GLUCOSE 2H P 100 G GLC PO SERPL-MCNC: 84 MG/DL (ref 65–154)
GLUCOSE 3H P 100 G GLC PO SERPL-MCNC: 80 MG/DL (ref 65–139)
GLUCOSE P FAST SERPL-MCNC: 82 MG/DL (ref 65–94)

## 2024-08-06 PROCEDURE — 36415 COLL VENOUS BLD VENIPUNCTURE: CPT

## 2024-08-07 NOTE — PROGRESS NOTES
[facility]  OB/GYN prenatal visit    S: 29 y.o.  30w4d here for PN visit. She has no obstetric complaints, including pelvic pain, contractions, vaginal bleeding, loss of fluid, or decreased fetal movement.    used [id number]  O:  Vitals:    24 0836   BP: 100/61   Pulse: 97   Resp: 18       Gen: no acute distress, nonlabored breathing  Fundal Height (cm): 31 cm  Fetal Heart Rate: 139    A/P:      IUP at 30w4d  No obstetric complaints today  1 hour GTT was 151 , 3 hr GTT normal but essentially the same value for all, patient reports was not given sugary drink, blood was just drawn.  Will contact the lab, reviewed with patient she needs to repeat her test. Her  hemoglobin 11.0  Vaccinations: Tdap given today  Discussed  labor precautions and fetal kick counts    Return to clinic in 2 weeks      Problem List          Endocrine    Abnormal glucose tolerance in pregnancy       Care Coordination    Language barrier       Obstetrics/Gynecology    30 weeks gestation of pregnancy    Overview     Labs  Pap smear collected today- negative   and GC/Ct -negative  Prenatal panelreviewed and wnl  28w labs to be ordered, 1 hr , to complete a fasting 3-hour GTT- she passed-needs repeat because she was not given sugary drink during that test.  hgb was 11.0  LDASA 162 mg daily until 36 weeks, first-degree relative with preeclampsia, long interval pregnancy indication  Declined NIPT  MSAFP neg  Vaccines: flu vaccine- no  Covid vaccines- none  Tdap vaccine: 2024  Contraception: undecided  Feeding plan: breast  Birth plan:  possibly with epidural  Delivery consent: signed 24  Ultrasounds:level II scheduled for 24- breech, no further US         Third trimester pregnancy     Other Visit Diagnoses       Encounter for immunization                   ADIEL Barcenas  2024  9:40 AM

## 2024-08-08 ENCOUNTER — ROUTINE PRENATAL (OUTPATIENT)
Dept: OBGYN CLINIC | Facility: CLINIC | Age: 29
End: 2024-08-08

## 2024-08-08 VITALS
RESPIRATION RATE: 18 BRPM | HEIGHT: 58 IN | SYSTOLIC BLOOD PRESSURE: 100 MMHG | DIASTOLIC BLOOD PRESSURE: 61 MMHG | HEART RATE: 97 BPM | BODY MASS INDEX: 28.55 KG/M2 | WEIGHT: 136 LBS

## 2024-08-08 DIAGNOSIS — O99.810 ABNORMAL GLUCOSE TOLERANCE IN PREGNANCY: ICD-10-CM

## 2024-08-08 DIAGNOSIS — Z3A.30 30 WEEKS GESTATION OF PREGNANCY: ICD-10-CM

## 2024-08-08 DIAGNOSIS — Z23 ENCOUNTER FOR IMMUNIZATION: ICD-10-CM

## 2024-08-08 DIAGNOSIS — Z34.93 THIRD TRIMESTER PREGNANCY: Primary | ICD-10-CM

## 2024-08-08 PROCEDURE — 90471 IMMUNIZATION ADMIN: CPT | Performed by: NURSE PRACTITIONER

## 2024-08-08 PROCEDURE — 99213 OFFICE O/P EST LOW 20 MIN: CPT | Performed by: NURSE PRACTITIONER

## 2024-08-08 PROCEDURE — 90715 TDAP VACCINE 7 YRS/> IM: CPT | Performed by: NURSE PRACTITIONER

## 2024-08-09 ENCOUNTER — APPOINTMENT (OUTPATIENT)
Dept: LAB | Facility: CLINIC | Age: 29
End: 2024-08-09

## 2024-08-09 DIAGNOSIS — O99.810 ABNORMAL GLUCOSE TOLERANCE IN PREGNANCY: ICD-10-CM

## 2024-08-09 LAB
GLUCOSE 1H P 100 G GLC PO SERPL-MCNC: 133 MG/DL (ref 65–179)
GLUCOSE 2H P 100 G GLC PO SERPL-MCNC: 155 MG/DL (ref 65–154)
GLUCOSE 3H P 100 G GLC PO SERPL-MCNC: 106 MG/DL (ref 65–139)
GLUCOSE P FAST SERPL-MCNC: 83 MG/DL (ref 65–94)

## 2024-08-09 PROCEDURE — 82952 GTT-ADDED SAMPLES: CPT

## 2024-08-09 PROCEDURE — 82951 GLUCOSE TOLERANCE TEST (GTT): CPT

## 2024-08-09 PROCEDURE — 36415 COLL VENOUS BLD VENIPUNCTURE: CPT

## 2024-08-12 ENCOUNTER — TELEPHONE (OUTPATIENT)
Dept: OBGYN CLINIC | Facility: CLINIC | Age: 29
End: 2024-08-12

## 2024-08-12 NOTE — TELEPHONE ENCOUNTER
----- Message from ADIEL Xie sent at 8/11/2024  6:46 PM EDT -----  Please inform pt that she had 1 abnormal value but she still passed her 3 hr GTT.  Thanks

## 2024-08-12 NOTE — TELEPHONE ENCOUNTER
ID [id number]    Attempted to call 2x, patient hung up both times. Patient does not have my chart. Will send letter to her home.

## 2024-08-21 NOTE — PROGRESS NOTES
[facility]  OB/GYN prenatal visit    S: 29 y.o.  32w3d here for PN visit. She has no obstetric complaints, including pelvic pain, contractions, vaginal bleeding, loss of fluid, or decreased fetal movement.    used [id number]  Passed repeat 3 hr GTT  O:  Vitals:    24 1527   BP: 106/64   Pulse: (!) 106   Resp: 18       Gen: no acute distress, nonlabored breathing  Fundal Height (cm): 32 cm  Fetal Heart Rate: 151    A/P:      IUP at 32w3d  No obstetric complaints today  She passed her 3 hr GTT  LDASA 162 mgs daily- taking  Vaccinations: had tdap  Contraception: nexplanon  Breastfeeding: Y  Birth plan:  with possible epidural   Discussed  labor precautions and fetal kick counts    Return to clinic in 2 weeks    Problem List          Endocrine    Abnormal glucose tolerance in pregnancy       Care Coordination    Language barrier       Obstetrics/Gynecology    32 weeks gestation of pregnancy    Overview     Labs  Pap smear collected today- negative   and GC/Ct -negative  Prenatal panelreviewed and wnl  28w labs to be ordered, 1 hr , to complete a fasting 3-hour GTT- she passed-needs repeat because she was not given sugary drink during that test.  hgb was 11.0. Passes repeat she had 1 abnormal result   LDASA 162 mg daily until 36 weeks, first-degree relative with preeclampsia, long interval pregnancy indication  Declined NIPT  MSAFP neg  Vaccines: flu vaccine- no  Covid vaccines- none  Tdap vaccine: 2024  Contraception: nexplanon  Feeding plan: breast  Birth plan:  possibly with epidural  Delivery consent: signed 24  Ultrasounds:level II scheduled for 24- breech, no further US         Third trimester pregnancy            ADIEL Barcenas  2024  3:47 PM

## 2024-08-22 ENCOUNTER — PATIENT OUTREACH (OUTPATIENT)
Dept: OBGYN CLINIC | Facility: CLINIC | Age: 29
End: 2024-08-22

## 2024-08-22 ENCOUNTER — ROUTINE PRENATAL (OUTPATIENT)
Dept: OBGYN CLINIC | Facility: CLINIC | Age: 29
End: 2024-08-22

## 2024-08-22 VITALS
HEIGHT: 58 IN | WEIGHT: 136 LBS | RESPIRATION RATE: 18 BRPM | SYSTOLIC BLOOD PRESSURE: 106 MMHG | DIASTOLIC BLOOD PRESSURE: 64 MMHG | BODY MASS INDEX: 28.55 KG/M2 | HEART RATE: 106 BPM

## 2024-08-22 DIAGNOSIS — Z75.8 LANGUAGE BARRIER: ICD-10-CM

## 2024-08-22 DIAGNOSIS — Z60.3 LANGUAGE BARRIER: ICD-10-CM

## 2024-08-22 DIAGNOSIS — Z3A.32 32 WEEKS GESTATION OF PREGNANCY: Primary | ICD-10-CM

## 2024-08-22 PROCEDURE — 99213 OFFICE O/P EST LOW 20 MIN: CPT | Performed by: NURSE PRACTITIONER

## 2024-08-22 SDOH — SOCIAL STABILITY - SOCIAL INSECURITY: ACCULTURATION DIFFICULTY: Z60.3

## 2024-08-22 NOTE — PROGRESS NOTES
RICHELLE RIOS used  service ID [id number] to meet with pt.     Pt reports being in contact with [facility] pregnancy [facility] and receiving baby supply support with diapers, wipes and clothing. Pt denies hearing from resource for a crib . RICHELLE RIOS contacted the resource and pt will be receiving a follow up call soon.     Pt reports that she will be able to buy a car seat if she receives support for a crib. RICHELLE RIOS will f/u with pt regarding resources in a month. PT denies additional concerns at this time.

## 2024-09-04 ENCOUNTER — ROUTINE PRENATAL (OUTPATIENT)
Dept: OBGYN CLINIC | Facility: CLINIC | Age: 29
End: 2024-09-04

## 2024-09-04 VITALS
SYSTOLIC BLOOD PRESSURE: 110 MMHG | HEART RATE: 92 BPM | DIASTOLIC BLOOD PRESSURE: 75 MMHG | HEIGHT: 58 IN | RESPIRATION RATE: 18 BRPM | BODY MASS INDEX: 28.97 KG/M2 | WEIGHT: 138 LBS

## 2024-09-04 DIAGNOSIS — M54.9 BACK PAIN DURING PREGNANCY IN THIRD TRIMESTER: ICD-10-CM

## 2024-09-04 DIAGNOSIS — G24.5 BLEPHAROSPASM: ICD-10-CM

## 2024-09-04 DIAGNOSIS — O26.893 BACK PAIN DURING PREGNANCY IN THIRD TRIMESTER: ICD-10-CM

## 2024-09-04 DIAGNOSIS — Z3A.34 34 WEEKS GESTATION OF PREGNANCY: Primary | ICD-10-CM

## 2024-09-04 PROCEDURE — 99213 OFFICE O/P EST LOW 20 MIN: CPT | Performed by: OBSTETRICS & GYNECOLOGY

## 2024-09-04 RX ORDER — LIDOCAINE 50 MG/G
1 PATCH TOPICAL DAILY
Qty: 30 PATCH | Refills: 0 | Status: SHIPPED | OUTPATIENT
Start: 2024-09-04

## 2024-09-04 NOTE — PROGRESS NOTES
Marybel Rivas is a 29 y.o.  who presents today for routine OB visit at 34w3d.     Blood Pressure: 110/75  Wt=62.6 kg (138 lb); Body mass index is 28.84 kg/m².; TWG=Not found.   [id number]    Abdomen: gravid, soft, non-tender.   by doppler  Fundal height 34 cm    She reports back pain, worst at night and up to 5/10, for which she has been taking baby aspirin and acetaminophen.  Sitting for prolonged periods causes the pain to be worse.  The acetaminophen is helpful for her back pain.  She is also having copious white vaginal discharge that does not smell abnormal.  She had a larger amount of discharge yesterday but then it decreased today.  She also is having some spasm of her right eye.  In the past she was given injections to help her relax the eye muscle twitching.  Feels she is having some intermittent uterine contractions, only a couple of times per day.  Denies vaginal bleeding or leaking of fluid, no urinary symptoms.  Is having some pain in association with the discharge.  Reports adequate fetal movement of at least 10 movements in 2 hours once daily.     Plan:   Prenatal Complications: none  Blood Type: O   RH status: positive  Birth Plan:  with possible epidural  Third trimester bloodwork passed 3hr GTT.  Vaccinations: Tdap completed, recommend flu/covid vaccines when available.  GBS status:   Reviewed premature labor precautions and fetal kick counts.  Advised to continue medications and return in 2 weeks.  Would like to change contraceptive method previously noted from Nexplanon to IUD.      Current Outpatient Medications:     aspirin 81 mg chewable tablet, Chew 2 tablets (162 mg total) daily at bedtime, Disp: 180 tablet, Rfl: 1    Prenatal Vit-Fe Fumarate-FA (Prenatal Plus Vitamin/Mineral) 27-1 MG TABS, Take 1 tablet by mouth in the morning, Disp: 90 tablet, Rfl: 3    pyridoxine (B-6) 25 MG tablet, Take 1 tablet (25 mg total) by mouth daily (Patient not taking: Reported  on 2024), Disp: 30 tablet, Rfl: 0      G2 Problems (from 24 to present)       Problem Noted Resolved    Third trimester pregnancy 2024 by ADIEL Xie No    32 weeks gestation of pregnancy 2024 by Latisha Sadler MD No    Overview Addendum 2024  3:44 PM by ADIEL Xie     Labs  Pap smear collected today- negative   and GC/Ct -negative  Prenatal panelreviewed and wnl  28w labs to be ordered, 1 hr , to complete a fasting 3-hour GTT- she passed-needs repeat because she was not given sugary drink during that test.  hgb was 11.0. Passes repeat she had 1 abnormal result   LDASA 162 mg daily until 36 weeks, first-degree relative with preeclampsia, long interval pregnancy indication  Declined NIPT  MSAFP neg  Vaccines: flu vaccine- no  Covid vaccines- none  Tdap vaccine: 2024  Contraception: desires IUD  Feeding plan: breast  Birth plan:  possibly with epidural  Delivery consent: signed 24  Ultrasounds:level II scheduled for 24- breech, no further US                 Evelin Ahuja DO

## 2024-09-04 NOTE — PATIENT INSTRUCTIONS
Patient Education     Embarazo - El octavo mes   Acerca de gladis tashi   Es importante que aprenda a cuidar de sí misma para poder tener un bebé saludable y un parto seguro. Es campbell contar con atención médica a lo yoko del embarazo.  El octavo mes del embarazo inicia alrededor de la semana 33 y dura hasta la semana 36. Saber qué scales avanzada se encuentra le indicará lo que es normal para dicha etapa del embarazo y le ayudará a planear a futuro.  General   Osborne cuerpo   Miguel el octavo mes de embarazo, puede esperar lo siguiente.  Usted puede:  Sentir más cansancio. Descanse lo más que pueda y tome pequeñas siestas, si es posible. Western también ayuda a aliviar la hinchazón de pies y tobillos.  Sentir calor todo el tiempo. Asegúrese de beber mucho líquido.  Observe que osborne bebé caiga más en osborne pelvis. Western facilita la respiración, chester es posible que deba ir al baño con más frecuencia. También puede notar más problemas con las hemorroides.  Tener más dolor de espalda  Observar tim sustancia transparente andria gelatina salpicada de marita cuando usa el baño. Cherelle es osborne tapón de mucosidad.  Sentirse menos firme al estar de pie. Western se debe a que simi caderas y articulaciones se están preparando para el nacimiento.  Hágase la prueba del estreptococo del zuleyma beta (EGB) para faviola si necesitará antibióticos miguel el trabajo de parto  Aumentar cerca de 1/2 a 1 mary beth (0.23 a 0.45 kg) a la semana miguel el larry de osborne embarazo. Es normal subir entre 5 y 10 libras (de 2.3 kg a 4.5 kg) en total en los primeros 4 meses.  Tener un perfil biofísico o PBF. Los médicos realizan tim ecografía para controlar la america de osborne bebé si usted tiene un alto riesgo o tiene problemas.  Hágase tim prueba no estresante, o PNE. El personal coloca monitores especiales alrededor de osborne abdomen para controlar la frecuencia cardíaca del bebé y buscar contracciones.  Crecimiento y desarrollo de osborne bebé:  El bebé está jane completamente crystal, chester  pasará el larry del tiempo creciendo dentro suyo.  Los pulmones son el último órgano en madurar, por lo que es importante que el bebé permanezca en el útero hasta la fecha de parto, si es posible.  Los huesos se fortalecen cada día. Los huesos del cráneo se mantienen un poco más blandos para facilitar el parto.  Son capaces de rascarse a sí mismos ya que simi uñas de desarrollaron.  Osborne bebé se está protegiendo de los gérmenes a medida que desarrolla anticuerpos.  Se mueve un poco menos porque hay menos espacio.  Osborne bebé mide alrededor de 19 pulgadas (48 cm) y pesa alrededor de 6 libras (2700 g). Tiene el tamaño aproximado de tim papaya o tim thomson.  Cosas que se deben considerar   Evitar el consumo de alcohol, medicamentos, productos de tabaco y humo de segunda mano.  Asegúrese de conocer las señales del trabajo de parto y saber cuándo llamar a osborne médico.  ¿Está planeando un parto natural o pensando en tim epidural? Sepa lo que puede hacer para ayudar a sobrellevar el dolor del parto.  Bronson vez esté interesada en donar marita del cordón umbilical a un banco.  ¿Tiene todo lo que necesita para después del nacimiento del bebé? Asegúrese de que el asiento para el automóvil quepa en el vehículo.  ¿Cuándo mariza llamar al médico?   Contracciones cada 10 minutos o con más frecuencia que no desaparecen al beber agua o cambiar de posición  Dolor de zenia que no desaparece, visión borrosa, faviola puntos o halos, aumento de la hinchazón de las lindsey, pies o ehsan y dolor bajo las costillas del lado derecho  Dolor de espalda bajo y sordo que no desaparece  Presión en la pelvis que se siente andria si el bebé estuviera empujando  Chorreo o secreción kannan de un líquido acuoso o con marita de la vagina  Poco o ausencia de movimiento del bebé en 2 horas. El bebé debería moverse al menos 10 veces cada 2 horas.  Hemorragia vaginal con o sin dolor  Fiebre de 100.4 °F (38 °C) o más kinza  Después de un accidente automovilístico, caída o  cualquier traumatismo en el abdomen  Ideas sobre lastimarse a sí misma o lastimar a otros, o no sentirse arriaga en casa  Exención de responsabilidad y uso de la información del consumidor   Esta información general es un resumen limitado de la información sobre el diagnóstico, el tratamiento y/o la medicación. No pretende ser exhaustivo y debe utilizarse andria tim herramienta para ayudar al usuario a comprender y/o evaluar las posibles opciones de diagnóstico y tratamiento. NO incluye toda la información sobre las enfermedades, los tratamientos, los medicamentos, los efectos secundarios o los riesgos que pueden aplicarse a un paciente específico. No tiene por objeto ser un consejo médico ni un sustituto del consejo médico. Tampoco pretende reemplazar al diagnóstico o el tratamiento proporcionados por un proveedor de atención médica con base en el examen y la evaluación por parte de gladis proveedor de las circunstancias específicas y únicas de un paciente. Los pacientes deben hablar con un proveedor de atención médica para obtener información completa sobre osborne america, preguntas médicas y opciones de tratamiento, incluidos los riesgos o beneficios relacionados con el uso de medicamentos. Esta información no respalda ningún tratamiento o medicamento andria seguro, eficaz o aprobado para tratar a un paciente específico. [employer] y simi afiliados renuncian a cualquier garantía o responsabilidad relacionada con esta información o con el uso que se brooke de esta. El uso de esta información se rige por las Condiciones de uso, disponibles en https://www.wolterskluwer.com/en/know/clinical-effectiveness-terms   Copyright   Copyright © 2024 [employer] y simi licenciantes y/o afiliados. Todos los derechos reservados.

## 2024-09-17 ENCOUNTER — ROUTINE PRENATAL (OUTPATIENT)
Dept: OBGYN CLINIC | Facility: CLINIC | Age: 29
End: 2024-09-17

## 2024-09-17 ENCOUNTER — HOSPITAL ENCOUNTER (OUTPATIENT)
Facility: HOSPITAL | Age: 29
Discharge: HOME | End: 2024-09-17
Attending: OBSTETRICS & GYNECOLOGY | Admitting: OBSTETRICS & GYNECOLOGY

## 2024-09-17 VITALS
HEART RATE: 98 BPM | SYSTOLIC BLOOD PRESSURE: 115 MMHG | RESPIRATION RATE: 18 BRPM | HEIGHT: 58 IN | DIASTOLIC BLOOD PRESSURE: 75 MMHG | BODY MASS INDEX: 29.81 KG/M2 | WEIGHT: 142 LBS

## 2024-09-17 VITALS
DIASTOLIC BLOOD PRESSURE: 71 MMHG | HEART RATE: 110 BPM | SYSTOLIC BLOOD PRESSURE: 110 MMHG | TEMPERATURE: 98.2 F | RESPIRATION RATE: 20 BRPM

## 2024-09-17 DIAGNOSIS — Z34.93 PRENATAL CARE, THIRD TRIMESTER: Primary | ICD-10-CM

## 2024-09-17 DIAGNOSIS — Z3A.36 36 WEEKS GESTATION OF PREGNANCY: ICD-10-CM

## 2024-09-17 PROBLEM — O36.8190 DECREASED FETAL MOVEMENT: Status: ACTIVE | Noted: 2024-09-17

## 2024-09-17 PROBLEM — Z34.90 VERTEX PRESENTATION OF FETUS: Status: ACTIVE | Noted: 2024-09-04

## 2024-09-17 PROCEDURE — 99213 OFFICE O/P EST LOW 20 MIN: CPT

## 2024-09-17 PROCEDURE — NC001 PR NO CHARGE: Performed by: OBSTETRICS & GYNECOLOGY

## 2024-09-17 PROCEDURE — 99213 OFFICE O/P EST LOW 20 MIN: CPT | Performed by: OBSTETRICS & GYNECOLOGY

## 2024-09-17 PROCEDURE — 87150 DNA/RNA AMPLIFIED PROBE: CPT

## 2024-09-17 NOTE — PROGRESS NOTES
OB/GYN Prenatal Visit    SUBJECTIVE:  Marybel Rivas is a 29 y.o.  at 36w2d here for prenatal visit. She has had some mild cramping and pink tinged discharge. She denies vaginal bleeding. She has had decreased fetal movement since last night. There is some movement but much less than usual. She has been having irregular contractions, mostly at night. They stopped at 11 am this morning     OBJECTIVE:  Vitals:    24 1437   BP: 115/75   Pulse: 98   Resp: 18       FHT: 149-156 bpm  Fundal height: appropriate for gestational age  SVE: 1.5/50/-3    Physical Exam  Constitutional:       General: She is not in acute distress.     Appearance: Normal appearance. She is not ill-appearing.   Genitourinary:      Genitourinary Comments: SVE 1.550/-3   Speculum exam: normal exam, no lesions or bleeding noted. GBS collected.    HENT:      Head: Normocephalic and atraumatic.      Mouth/Throat:      Mouth: Mucous membranes are moist. No oral lesions.   Eyes:      General: No scleral icterus.     Extraocular Movements: Extraocular movements intact.   Cardiovascular:      Rate and Rhythm: Normal rate and regular rhythm.      Pulses: Normal pulses.      Heart sounds: Normal heart sounds.   Pulmonary:      Effort: Pulmonary effort is normal. No respiratory distress.      Breath sounds: Normal breath sounds.   Abdominal:      Palpations: Abdomen is soft.      Tenderness: There is no abdominal tenderness. There is no guarding or rebound.      Comments: Gravid   Musculoskeletal:      Right lower leg: No edema.      Left lower leg: No edema.   Neurological:      General: No focal deficit present.      Mental Status: She is alert.   Skin:     General: Skin is warm and dry.   Psychiatric:         Attention and Perception: Attention normal.         Mood and Affect: Mood normal.         Speech: Speech normal.         Behavior: Behavior normal.         Thought Content: Thought content normal.         Judgment: Judgment normal.    Vitals and nursing note reviewed. Exam conducted with a chaperone present.           ASSESSMENT / PLAN:    Problem List       36 weeks gestation of pregnancy    Overview     Labs  Pap smear collected today- negative   and GC/Ct -negative  Prenatal panelreviewed and wnl  28w labs to be ordered, 1 hr , to complete a fasting 3-hour GTT- she passed-needs repeat because she was not given sugary drink during that test.  hgb was 11.0. Passes repeat she had 1 abnormal result   LDASA 162 mg daily until 36 weeks, first-degree relative with preeclampsia, long interval pregnancy indication  Declined NIPT  MSAFP neg  Vaccines: flu vaccine- no  Covid vaccines- none  Tdap vaccine: 2024  Contraception: desires IUD  Feeding plan: breast  Birth plan:  possibly with epidural  Delivery consent: signed 24  Ultrasounds:level II scheduled for 24- breech, no further US         Current Assessment & Plan     Complains of DFM, irregular contractions, pink discharge  SVE in office 1.550/-3  No bleeding on speculum exam  Sent patient to triage for evaluation  GBS collected         Third trimester pregnancy    Language barrier    Abnormal glucose tolerance in pregnancy    Breech presentation    Overview     Recheck presentation at 36 wks          Other Visit Diagnoses       Prenatal care, third trimester    -  Primary                  Shantell Hogue MD  2024  3:22 PM

## 2024-09-17 NOTE — PROGRESS NOTES
L&D Triage Note - OB/GYN  Marybel Rivas 29 y.o. female MRN: [id number]  Unit/Bed#:  TRIAGE  Encounter: [id number]      ASSESSMENT:    Marybel Rivas is a 29 y.o.  at 36w2d presenting was sent from the office for DFM and lost of mucus plug. During her stay in triage, pt felt fetal movement.    PLAN:    1) DFM  - NST reative , 145 bpm  - DANITZA 11.70 cm (fetal movement during ultrasound)    2) Vaginal discharge  - Speculum exam: no LOF, no VB, no pooling.  - Microscopy negative, no ferning  - Nitrazine negative     3) 36 weeks gestation of pregnancy  - Continue routine prenatal care  - Discharge from OB triage with  labor precautions    - Reviewed rupture of membranes, false vs true labor, decreased fetal movement, and vaginal bleeding   - Pt to call provider with any concerns and follow up at her next scheduled prenatal appointment    - Case discussed with Dr. Moran    SUBJECTIVE:    Marybel Rivas 29 y.o.  at 36w2d with an Estimated Date of Delivery: 10/13/24 who presents to triage for DFM.  She otherwise denies contractions, leakage of fluid, and vaginal bleeding.    Her current obstetrical history is significant for Abnormal glucose tolerance in pregnancy.    Her past obstetrical history is significant for one term vaginal delivery.    OBJECTIVE:    Vitals:    24 1726   BP: 110/71   Pulse: (!) 110   Resp: 20   Temp: 98.2 °F (36.8 °C)       ROS:  Constitutional: Negative  Respiratory: Negative  Cardiovascular: Negative    Gastrointestinal: Negative    General Physical Exam:  General: Well appearing, no distress  Respiratory: Unlabored breathing  Cardiovascular: Regular rate.  Abdomen: Soft, gravid, nontender  Fundal Height: Appropriate for gestational age.  Extremities: Warm and well perfused.  Non tender.      Cervical Exam  Speculum: Cervical os is visually closed, no VB or LOF  SVE: 1.5/50/-3    FETAL ASSESSMENT:  Fetal heart rate: Baseline Rate (FHR): 145 bpm  Variability:  Moderate  Accelerations: 15 x 15 or greater, With fetal movement  Decelerations: None  [place]:      KOH/WTMT:     Infection:   - No clue cells    - No hyphae   - No trichomonads present    Membrane status   - No ferning   - No nitrazene   - No pooling       Imaging:               Abd. US   DANITZA      - Q1 3.55 cm     - Q2 4.29 cm     - Q3 3.33 cm     - Q4 0.53 cm     - Total: 11.70 cm      Presentation: Vertex    Blanca Pendleton MD  OBGYN, PGY-1  09/17/24  6:12 PM

## 2024-09-17 NOTE — DISCHARGE INSTR - AVS FIRST PAGE
Vaginal spotting is normal after today's examination.  Please call or return if you experience a gush of watery fluid, heavy vaginal bleeding, regular painful contractions that are less than 10 minutes apart for 2 hours, or baby not meeting kick counts.  Please follow-up with your routinely scheduled prenatal appointments.

## 2024-09-19 ENCOUNTER — PATIENT OUTREACH (OUTPATIENT)
Dept: OBGYN CLINIC | Facility: CLINIC | Age: 29
End: 2024-09-19

## 2024-09-19 LAB — GP B STREP DNA SPEC QL NAA+PROBE: NEGATIVE

## 2024-09-19 NOTE — PROGRESS NOTES
RICHELLE RIOS used interprter service ID [id number] to contact pt for f/u    Pt reports that she received support for the crib and she was able to get a car seat. Pt reports getting assistance from [facility] for baby supplies including wipes. Pt denies concerns for baby supplies at this time. RICHELLE RIOS will f/u with pt when post partum.

## 2024-09-23 ENCOUNTER — ROUTINE PRENATAL (OUTPATIENT)
Dept: OBGYN CLINIC | Facility: CLINIC | Age: 29
End: 2024-09-23

## 2024-09-23 VITALS
WEIGHT: 143 LBS | HEART RATE: 80 BPM | SYSTOLIC BLOOD PRESSURE: 110 MMHG | RESPIRATION RATE: 18 BRPM | BODY MASS INDEX: 30.02 KG/M2 | DIASTOLIC BLOOD PRESSURE: 74 MMHG | HEIGHT: 58 IN

## 2024-09-23 DIAGNOSIS — Z3A.37 37 WEEKS GESTATION OF PREGNANCY: Primary | ICD-10-CM

## 2024-09-23 NOTE — PROGRESS NOTES
Marybel Rivas presents today for routine OB visit at 37w1d.  She reports constipation and acid reflux for which she takes milk. Denies uterine contractions or persistent cramping.  Denies vaginal bleeding or leaking of fluid.  Reports fetal movement.  Scheduled for next ultrasound: Completed.   Genetic screening plans: Done  Reviewed common discomforts of pregnancy in second trimester and warning signs.  Advised to stop taking Aspirin. Stated she has stopped already.  Advised to continue prenatal vitamins and return in 1 weeks.  Via      G2 Problems (from 24 to present)       Problem Noted Resolved    Vertex presentation of fetus 2024 by Heather Griffith MD No    Overview Signed 2024  4:33 PM by Heather Griffith MD     Recheck presentation at 36 wks         Third trimester pregnancy 2024 by ADIEL Xie No    36 weeks gestation of pregnancy 2024 by Latisha Sadler MD No    Overview Addendum 2024  3:50 PM by Evelin Ahuja, DO     Labs  Pap smear collected today- negative   and GC/Ct -negative  Prenatal panelreviewed and wnl  28w labs to be ordered, 1 hr , to complete a fasting 3-hour GTT- she passed-needs repeat because she was not given sugary drink during that test.  hgb was 11.0. Passes repeat she had 1 abnormal result   LDASA 162 mg daily until 36 weeks, first-degree relative with preeclampsia, long interval pregnancy indication  Declined NIPT  MSAFP neg  Vaccines: flu vaccine- no  Covid vaccines- none  Tdap vaccine: 2024  Contraception: desires IUD  Feeding plan: breast  Birth plan:  possibly with epidural  Delivery consent: signed 24  Ultrasounds:level II scheduled for 24- breech, no further US                 Evens Cleemnte MD  2024  9:49 AM

## 2024-09-26 NOTE — PROGRESS NOTES
"[facility] primary because of the right  OB/GYN prenatal visit    S: 29 y.o.  38w0d here for PN visit. She has no obstetric complaints, including pelvic pain, contractions, vaginal bleeding, loss of fluid, or decreased fetal movement.    [id number]  She reports she has a  dsch that is white, no burning,  has a \"ball \" that is itchy. She has never had before. It is not painful    O:  Vitals:    24 0939   BP: 101/70   Pulse: 91       Gen: no acute distress, nonlabored breathing  Fundal Height (cm): 38 cm  Fetal Heart Rate: 140  Wet mt- KOH- all negative  Cx appears closed, no lesions  Rt vulva with small group of lesions, weeping appearance, HSV culture done.  A/P:      IUP at 38w0d  Suspect HSV, culture done- Valtrex 1 gm BID x7 days ordered- await results- if positive would need Primary C/S  Reviewed HSV and precautions, avoid sharing personal products, information also given in Irish  No further US  She stopped her LDASA  Vaccinations: had Tdap  Contraception: IUD  Breastfeeding: Y  Birth plan: , possibly epidural   Discussed term labor precautions and fetal kick counts    Return to clinic in 1 weeks    G2 Problems (from 24 to present)       Problem Noted Resolved    Vertex presentation of fetus 2024 by Heather Griffith MD No    Overview Signed 2024  4:33 PM by Heather Griffith MD     Recheck presentation at 36 wks         Third trimester pregnancy 2024 by ADIEL Xie No    38 weeks gestation of pregnancy 2024 by Latisha Sadler MD No    Overview Addendum 2024  3:50 PM by Evelin Ahuja, DO     Labs  Pap smear collected today- negative   and GC/Ct -negative  Prenatal panelreviewed and wnl  28w labs to be ordered, 1 hr , to complete a fasting 3-hour GTT- she passed-needs repeat because she was not given sugary drink during that test.  hgb was 11.0. Passes repeat she had 1 abnormal result   LDASA 162 mg daily until " 36 weeks, first-degree relative with preeclampsia, long interval pregnancy indication  Declined NIPT  MSAFP neg  Vaccines: flu vaccine- no  Covid vaccines- none  Tdap vaccine: 2024  Contraception: desires IUD  Feeding plan: breast  Birth plan:  possibly with epidural  Delivery consent: signed 24  Ultrasounds:level II scheduled for 24- breech, no further US                    ADIEL Barcenas  2024  12:42 PM

## 2024-09-29 PROBLEM — Z3A.38 38 WEEKS GESTATION OF PREGNANCY: Status: ACTIVE | Noted: 2024-04-04

## 2024-09-30 ENCOUNTER — ROUTINE PRENATAL (OUTPATIENT)
Dept: OBGYN CLINIC | Facility: CLINIC | Age: 29
End: 2024-09-30

## 2024-09-30 VITALS
DIASTOLIC BLOOD PRESSURE: 70 MMHG | SYSTOLIC BLOOD PRESSURE: 101 MMHG | HEIGHT: 58 IN | BODY MASS INDEX: 30.23 KG/M2 | HEART RATE: 91 BPM | WEIGHT: 144 LBS

## 2024-09-30 DIAGNOSIS — N90.89 LABIAL LESION: ICD-10-CM

## 2024-09-30 DIAGNOSIS — Z34.93 THIRD TRIMESTER PREGNANCY: Primary | ICD-10-CM

## 2024-09-30 DIAGNOSIS — Z3A.38 38 WEEKS GESTATION OF PREGNANCY: ICD-10-CM

## 2024-09-30 LAB
BV WHIFF TEST VAG QL: NORMAL
CLUE CELLS SPEC QL WET PREP: NORMAL
PH SMN: 5 [PH]
SL AMB POCT WET MOUNT: NORMAL
T VAGINALIS VAG QL WET PREP: NORMAL
YEAST VAG QL WET PREP: NORMAL

## 2024-09-30 PROCEDURE — 87529 HSV DNA AMP PROBE: CPT | Performed by: NURSE PRACTITIONER

## 2024-09-30 PROCEDURE — 99213 OFFICE O/P EST LOW 20 MIN: CPT | Performed by: NURSE PRACTITIONER

## 2024-09-30 PROCEDURE — 87210 SMEAR WET MOUNT SALINE/INK: CPT | Performed by: NURSE PRACTITIONER

## 2024-09-30 RX ORDER — VALACYCLOVIR HYDROCHLORIDE 1 G/1
1000 TABLET, FILM COATED ORAL 2 TIMES DAILY
Qty: 20 TABLET | Refills: 0 | Status: SHIPPED | OUTPATIENT
Start: 2024-09-30 | End: 2024-10-10

## 2024-10-01 ENCOUNTER — ANESTHESIA EVENT (INPATIENT)
Dept: LABOR AND DELIVERY | Facility: HOSPITAL | Age: 29
DRG: 540 | End: 2024-10-01
Payer: COMMERCIAL

## 2024-10-01 ENCOUNTER — HOSPITAL ENCOUNTER (INPATIENT)
Facility: HOSPITAL | Age: 29
LOS: 3 days | Discharge: HOME | DRG: 540 | End: 2024-10-04
Attending: OBSTETRICS & GYNECOLOGY | Admitting: OBSTETRICS & GYNECOLOGY
Payer: COMMERCIAL

## 2024-10-01 DIAGNOSIS — N90.89 LABIAL LESION: ICD-10-CM

## 2024-10-01 DIAGNOSIS — Z3A.38 38 WEEKS GESTATION OF PREGNANCY: ICD-10-CM

## 2024-10-01 DIAGNOSIS — Z01.818 PRE-OP EXAM: ICD-10-CM

## 2024-10-01 DIAGNOSIS — Z34.90: ICD-10-CM

## 2024-10-01 DIAGNOSIS — Z98.891 S/P PRIMARY LOW TRANSVERSE C-SECTION: Primary | ICD-10-CM

## 2024-10-01 LAB
ABO GROUP BLD: NORMAL
BASE EXCESS BLDCOA CALC-SCNC: -1.3 MMOL/L (ref 3–11)
BASE EXCESS BLDCOV CALC-SCNC: -3.1 MMOL/L (ref 1–9)
BLD GP AB SCN SERPL QL: NEGATIVE
ERYTHROCYTE [DISTWIDTH] IN BLOOD BY AUTOMATED COUNT: 13.9 % (ref 11.6–15.1)
HCO3 BLDCOA-SCNC: 25.9 MMOL/L (ref 17.3–27.3)
HCO3 BLDCOV-SCNC: 22.5 MMOL/L (ref 12.2–28.6)
HCT VFR BLD AUTO: 36.2 % (ref 34.8–46.1)
HGB BLD-MCNC: 12 G/DL (ref 11.5–15.4)
HOLD SPECIMEN: NORMAL
HOLD SPECIMEN: NORMAL
HSV1 DNA SPEC QL NAA+PROBE: DETECTED
HSV1 DNA SPEC QL NAA+PROBE: NOT DETECTED
MCH RBC QN AUTO: 27.1 PG (ref 26.8–34.3)
MCHC RBC AUTO-ENTMCNC: 33.1 G/DL (ref 31.4–37.4)
MCV RBC AUTO: 82 FL (ref 82–98)
O2 CT VFR BLDCOA CALC: 7.7 ML/DL
OXYHGB MFR BLDCOA: 32.5 %
OXYHGB MFR BLDCOV: 74.4 %
PCO2 BLDCOA: 52.3 MM[HG] (ref 30–60)
PCO2 BLDCOV: 42.2 MM HG (ref 27–43)
PH BLDCOA: 7.31 [PH] (ref 7.23–7.43)
PH BLDCOV: 7.34 [PH] (ref 7.19–7.49)
PLATELET # BLD AUTO: 264 THOUSANDS/UL (ref 149–390)
PMV BLD AUTO: 10.6 FL (ref 8.9–12.7)
PO2 BLDCOA: 16.3 MM HG (ref 5–25)
PO2 BLDCOV: 32.2 MM HG (ref 15–45)
RBC # BLD AUTO: 4.42 MILLION/UL (ref 3.81–5.12)
RH BLD: POSITIVE
SAO2 % BLDCOV: 17.3 ML/DL
SPECIMEN EXPIRATION DATE: NORMAL
TREPONEMA PALLIDUM IGG+IGM AB [PRESENCE] IN SERUM OR PLASMA BY IMMUNOASSAY: NORMAL
WBC # BLD AUTO: 11.08 THOUSAND/UL (ref 4.31–10.16)

## 2024-10-01 PROCEDURE — NC001 PR NO CHARGE: Performed by: OBSTETRICS & GYNECOLOGY

## 2024-10-01 PROCEDURE — 86900 BLOOD TYPING SEROLOGIC ABO: CPT

## 2024-10-01 PROCEDURE — 99213 OFFICE O/P EST LOW 20 MIN: CPT

## 2024-10-01 PROCEDURE — 86850 RBC ANTIBODY SCREEN: CPT

## 2024-10-01 PROCEDURE — 88307 TISSUE EXAM BY PATHOLOGIST: CPT | Performed by: PATHOLOGY

## 2024-10-01 PROCEDURE — 82805 BLOOD GASES W/O2 SATURATION: CPT | Performed by: OBSTETRICS & GYNECOLOGY

## 2024-10-01 PROCEDURE — 86695 HERPES SIMPLEX TYPE 1 TEST: CPT

## 2024-10-01 PROCEDURE — 4A1HXCZ MONITORING OF PRODUCTS OF CONCEPTION, CARDIAC RATE, EXTERNAL APPROACH: ICD-10-PCS | Performed by: OBSTETRICS & GYNECOLOGY

## 2024-10-01 PROCEDURE — 86780 TREPONEMA PALLIDUM: CPT

## 2024-10-01 PROCEDURE — 86696 HERPES SIMPLEX TYPE 2 TEST: CPT

## 2024-10-01 PROCEDURE — 59514 CESAREAN DELIVERY ONLY: CPT | Performed by: OBSTETRICS & GYNECOLOGY

## 2024-10-01 PROCEDURE — 85027 COMPLETE CBC AUTOMATED: CPT

## 2024-10-01 PROCEDURE — 86901 BLOOD TYPING SEROLOGIC RH(D): CPT

## 2024-10-01 RX ORDER — OXYCODONE HYDROCHLORIDE 5 MG/1
5 TABLET ORAL EVERY 4 HOURS PRN
Status: ACTIVE | OUTPATIENT
Start: 2024-10-01 | End: 2024-10-02

## 2024-10-01 RX ORDER — ACETAMINOPHEN 325 MG/1
650 TABLET ORAL EVERY 6 HOURS SCHEDULED
Status: DISCONTINUED | OUTPATIENT
Start: 2024-10-02 | End: 2024-10-02

## 2024-10-01 RX ORDER — OXYCODONE HYDROCHLORIDE 5 MG/1
5 TABLET ORAL EVERY 4 HOURS PRN
Status: DISCONTINUED | OUTPATIENT
Start: 2024-10-01 | End: 2024-10-01

## 2024-10-01 RX ORDER — CALCIUM CARBONATE 500 MG/1
1000 TABLET, CHEWABLE ORAL DAILY PRN
Status: DISCONTINUED | OUTPATIENT
Start: 2024-10-01 | End: 2024-10-04 | Stop reason: HOSPADM

## 2024-10-01 RX ORDER — ACETAMINOPHEN 325 MG/1
650 TABLET ORAL EVERY 6 HOURS SCHEDULED
Status: COMPLETED | OUTPATIENT
Start: 2024-10-01 | End: 2024-10-02

## 2024-10-01 RX ORDER — BUPIVACAINE HYDROCHLORIDE 2.5 MG/ML
30 INJECTION, SOLUTION EPIDURAL; INFILTRATION; INTRACAUDAL ONCE AS NEEDED
Status: DISCONTINUED | OUTPATIENT
Start: 2024-10-01 | End: 2024-10-01

## 2024-10-01 RX ORDER — OXYCODONE HYDROCHLORIDE 10 MG/1
10 TABLET ORAL EVERY 4 HOURS PRN
Status: DISCONTINUED | OUTPATIENT
Start: 2024-10-02 | End: 2024-10-04 | Stop reason: HOSPADM

## 2024-10-01 RX ORDER — CEFAZOLIN SODIUM 1 G/50ML
1000 SOLUTION INTRAVENOUS EVERY 8 HOURS
Status: DISCONTINUED | OUTPATIENT
Start: 2024-10-01 | End: 2024-10-01

## 2024-10-01 RX ORDER — EPHEDRINE SULFATE 50 MG/ML
INJECTION INTRAVENOUS AS NEEDED
Status: DISCONTINUED | OUTPATIENT
Start: 2024-10-01 | End: 2024-10-01

## 2024-10-01 RX ORDER — BUPIVACAINE HYDROCHLORIDE 7.5 MG/ML
INJECTION, SOLUTION INTRASPINAL AS NEEDED
Status: DISCONTINUED | OUTPATIENT
Start: 2024-10-01 | End: 2024-10-01

## 2024-10-01 RX ORDER — OXYTOCIN/RINGER'S LACTATE 30/500 ML
PLASTIC BAG, INJECTION (ML) INTRAVENOUS CONTINUOUS PRN
Status: DISCONTINUED | OUTPATIENT
Start: 2024-10-01 | End: 2024-10-01

## 2024-10-01 RX ORDER — OXYCODONE HYDROCHLORIDE 10 MG/1
10 TABLET ORAL EVERY 4 HOURS PRN
Status: DISCONTINUED | OUTPATIENT
Start: 2024-10-01 | End: 2024-10-01

## 2024-10-01 RX ORDER — KETOROLAC TROMETHAMINE 30 MG/ML
INJECTION, SOLUTION INTRAMUSCULAR; INTRAVENOUS AS NEEDED
Status: DISCONTINUED | OUTPATIENT
Start: 2024-10-01 | End: 2024-10-01

## 2024-10-01 RX ORDER — OXYCODONE HYDROCHLORIDE 5 MG/1
5 TABLET ORAL EVERY 4 HOURS PRN
Status: DISCONTINUED | OUTPATIENT
Start: 2024-10-02 | End: 2024-10-04 | Stop reason: HOSPADM

## 2024-10-01 RX ORDER — ONDANSETRON 2 MG/ML
INJECTION INTRAMUSCULAR; INTRAVENOUS AS NEEDED
Status: DISCONTINUED | OUTPATIENT
Start: 2024-10-01 | End: 2024-10-01

## 2024-10-01 RX ORDER — SODIUM CHLORIDE, SODIUM LACTATE, POTASSIUM CHLORIDE, CALCIUM CHLORIDE 600; 310; 30; 20 MG/100ML; MG/100ML; MG/100ML; MG/100ML
125 INJECTION, SOLUTION INTRAVENOUS CONTINUOUS
Status: DISCONTINUED | OUTPATIENT
Start: 2024-10-01 | End: 2024-10-04 | Stop reason: HOSPADM

## 2024-10-01 RX ORDER — SODIUM CHLORIDE, SODIUM LACTATE, POTASSIUM CHLORIDE, CALCIUM CHLORIDE 600; 310; 30; 20 MG/100ML; MG/100ML; MG/100ML; MG/100ML
125 INJECTION, SOLUTION INTRAVENOUS CONTINUOUS
Status: DISCONTINUED | OUTPATIENT
Start: 2024-10-01 | End: 2024-10-02

## 2024-10-01 RX ORDER — ONDANSETRON 2 MG/ML
4 INJECTION INTRAMUSCULAR; INTRAVENOUS EVERY 8 HOURS PRN
Status: DISCONTINUED | OUTPATIENT
Start: 2024-10-01 | End: 2024-10-04 | Stop reason: HOSPADM

## 2024-10-01 RX ORDER — OXYTOCIN/RINGER'S LACTATE 30/500 ML
62.5 PLASTIC BAG, INJECTION (ML) INTRAVENOUS ONCE
Status: DISCONTINUED | OUTPATIENT
Start: 2024-10-01 | End: 2024-10-04 | Stop reason: HOSPADM

## 2024-10-01 RX ORDER — CITRIC ACID/SODIUM CITRATE 334-500MG
30 SOLUTION, ORAL ORAL 4 TIMES DAILY PRN
Status: DISCONTINUED | OUTPATIENT
Start: 2024-10-01 | End: 2024-10-04 | Stop reason: HOSPADM

## 2024-10-01 RX ORDER — DOCUSATE SODIUM 100 MG/1
100 CAPSULE, LIQUID FILLED ORAL 2 TIMES DAILY
Status: DISCONTINUED | OUTPATIENT
Start: 2024-10-01 | End: 2024-10-04

## 2024-10-01 RX ORDER — KETOROLAC TROMETHAMINE 30 MG/ML
30 INJECTION, SOLUTION INTRAMUSCULAR; INTRAVENOUS EVERY 6 HOURS SCHEDULED
Status: DISCONTINUED | OUTPATIENT
Start: 2024-10-02 | End: 2024-10-02

## 2024-10-01 RX ORDER — NALOXONE HYDROCHLORIDE 0.4 MG/ML
0.1 INJECTION, SOLUTION INTRAMUSCULAR; INTRAVENOUS; SUBCUTANEOUS
Status: ACTIVE | OUTPATIENT
Start: 2024-10-01 | End: 2024-10-02

## 2024-10-01 RX ORDER — BENZOCAINE/MENTHOL 6 MG-10 MG
1 LOZENGE MUCOUS MEMBRANE DAILY PRN
Status: DISCONTINUED | OUTPATIENT
Start: 2024-10-01 | End: 2024-10-04 | Stop reason: HOSPADM

## 2024-10-01 RX ORDER — ONDANSETRON 2 MG/ML
4 INJECTION INTRAMUSCULAR; INTRAVENOUS EVERY 6 HOURS PRN
Status: DISCONTINUED | OUTPATIENT
Start: 2024-10-01 | End: 2024-10-01

## 2024-10-01 RX ORDER — VALACYCLOVIR HYDROCHLORIDE 500 MG/1
1000 TABLET, FILM COATED ORAL 2 TIMES DAILY
Status: DISCONTINUED | OUTPATIENT
Start: 2024-10-01 | End: 2024-10-04 | Stop reason: HOSPADM

## 2024-10-01 RX ORDER — METRONIDAZOLE 500 MG/100ML
500 INJECTION, SOLUTION INTRAVENOUS EVERY 8 HOURS
Status: DISCONTINUED | OUTPATIENT
Start: 2024-10-01 | End: 2024-10-01

## 2024-10-01 RX ORDER — FENTANYL CITRATE 50 UG/ML
INJECTION, SOLUTION INTRAMUSCULAR; INTRAVENOUS AS NEEDED
Status: DISCONTINUED | OUTPATIENT
Start: 2024-10-01 | End: 2024-10-01

## 2024-10-01 RX ORDER — IBUPROFEN 600 MG/1
600 TABLET, FILM COATED ORAL EVERY 6 HOURS
Status: DISCONTINUED | OUTPATIENT
Start: 2024-10-04 | End: 2024-10-02

## 2024-10-01 RX ORDER — OXYCODONE HYDROCHLORIDE 10 MG/1
10 TABLET ORAL EVERY 4 HOURS PRN
Status: ACTIVE | OUTPATIENT
Start: 2024-10-01 | End: 2024-10-02

## 2024-10-01 RX ORDER — DIPHENHYDRAMINE HCL 25 MG
25 TABLET ORAL EVERY 6 HOURS PRN
Status: DISCONTINUED | OUTPATIENT
Start: 2024-10-01 | End: 2024-10-04 | Stop reason: HOSPADM

## 2024-10-01 RX ORDER — KETOROLAC TROMETHAMINE 30 MG/ML
30 INJECTION, SOLUTION INTRAMUSCULAR; INTRAVENOUS EVERY 6 HOURS
Status: DISPENSED | OUTPATIENT
Start: 2024-10-01 | End: 2024-10-02

## 2024-10-01 RX ORDER — MORPHINE SULFATE 0.5 MG/ML
INJECTION, SOLUTION EPIDURAL; INTRATHECAL; INTRAVENOUS AS NEEDED
Status: DISCONTINUED | OUTPATIENT
Start: 2024-10-01 | End: 2024-10-01

## 2024-10-01 RX ORDER — CEFAZOLIN SODIUM 1 G/50ML
1000 SOLUTION INTRAVENOUS ONCE
Status: DISCONTINUED | OUTPATIENT
Start: 2024-10-01 | End: 2024-10-01

## 2024-10-01 RX ORDER — METOCLOPRAMIDE HYDROCHLORIDE 5 MG/ML
10 INJECTION INTRAMUSCULAR; INTRAVENOUS EVERY 6 HOURS PRN
Status: DISCONTINUED | OUTPATIENT
Start: 2024-10-01 | End: 2024-10-04 | Stop reason: HOSPADM

## 2024-10-01 RX ORDER — CEFAZOLIN SODIUM 1 G/50ML
SOLUTION INTRAVENOUS AS NEEDED
Status: DISCONTINUED | OUTPATIENT
Start: 2024-10-01 | End: 2024-10-01

## 2024-10-01 RX ORDER — SIMETHICONE 80 MG
80 TABLET,CHEWABLE ORAL 4 TIMES DAILY PRN
Status: DISCONTINUED | OUTPATIENT
Start: 2024-10-01 | End: 2024-10-04 | Stop reason: HOSPADM

## 2024-10-01 RX ORDER — NALBUPHINE HYDROCHLORIDE 10 MG/ML
5 INJECTION, SOLUTION INTRAMUSCULAR; INTRAVENOUS; SUBCUTANEOUS
Status: ACTIVE | OUTPATIENT
Start: 2024-10-01 | End: 2024-10-02

## 2024-10-01 RX ORDER — ONDANSETRON 2 MG/ML
INJECTION INTRAMUSCULAR; INTRAVENOUS
Status: COMPLETED
Start: 2024-10-01 | End: 2024-10-01

## 2024-10-01 RX ADMIN — BUPIVACAINE HYDROCHLORIDE IN DEXTROSE 1.4 ML: 7.5 INJECTION, SOLUTION SUBARACHNOID at 10:13

## 2024-10-01 RX ADMIN — ACETAMINOPHEN 650 MG: 325 TABLET ORAL at 15:04

## 2024-10-01 RX ADMIN — KETOROLAC TROMETHAMINE 30 MG: 30 INJECTION, SOLUTION INTRAMUSCULAR; INTRAVENOUS at 16:41

## 2024-10-01 RX ADMIN — SODIUM CHLORIDE, SODIUM LACTATE, POTASSIUM CHLORIDE, AND CALCIUM CHLORIDE: .6; .31; .03; .02 INJECTION, SOLUTION INTRAVENOUS at 10:09

## 2024-10-01 RX ADMIN — FENTANYL CITRATE 15 MCG: 50 INJECTION INTRAMUSCULAR; INTRAVENOUS at 10:13

## 2024-10-01 RX ADMIN — PHENYLEPHRINE HYDROCHLORIDE 40 MCG/MIN: 50 INJECTION INTRAVENOUS at 10:09

## 2024-10-01 RX ADMIN — VALACYCLOVIR HYDROCHLORIDE 1000 MG: 500 TABLET, FILM COATED ORAL at 15:04

## 2024-10-01 RX ADMIN — DOCUSATE SODIUM 100 MG: 100 CAPSULE, LIQUID FILLED ORAL at 16:41

## 2024-10-01 RX ADMIN — EPHEDRINE SULFATE 10 MG: 50 INJECTION INTRAVENOUS at 10:18

## 2024-10-01 RX ADMIN — Medication 500 MG: at 10:15

## 2024-10-01 RX ADMIN — MORPHINE SULFATE 0.15 MG: 0.5 INJECTION, SOLUTION EPIDURAL; INTRATHECAL; INTRAVENOUS at 10:13

## 2024-10-01 RX ADMIN — ONDANSETRON 4 MG: 2 INJECTION INTRAMUSCULAR; INTRAVENOUS at 13:57

## 2024-10-01 RX ADMIN — ACETAMINOPHEN 650 MG: 325 TABLET ORAL at 21:23

## 2024-10-01 RX ADMIN — Medication 999 MILLI-UNITS/MIN: at 10:28

## 2024-10-01 RX ADMIN — SODIUM CHLORIDE, SODIUM LACTATE, POTASSIUM CHLORIDE, AND CALCIUM CHLORIDE 125 ML/HR: .6; .31; .03; .02 INJECTION, SOLUTION INTRAVENOUS at 21:23

## 2024-10-01 RX ADMIN — KETOROLAC TROMETHAMINE 30 MG: 30 INJECTION, SOLUTION INTRAMUSCULAR; INTRAVENOUS at 10:53

## 2024-10-01 RX ADMIN — CEFAZOLIN SODIUM 1000 MG: 1 SOLUTION INTRAVENOUS at 10:15

## 2024-10-01 RX ADMIN — VALACYCLOVIR HYDROCHLORIDE 1000 MG: 500 TABLET, FILM COATED ORAL at 21:23

## 2024-10-01 RX ADMIN — KETOROLAC TROMETHAMINE 30 MG: 30 INJECTION, SOLUTION INTRAMUSCULAR; INTRAVENOUS at 23:25

## 2024-10-01 RX ADMIN — METOCLOPRAMIDE 10 MG: 5 INJECTION, SOLUTION INTRAMUSCULAR; INTRAVENOUS at 16:41

## 2024-10-01 RX ADMIN — ONDANSETRON 4 MG: 2 INJECTION INTRAMUSCULAR; INTRAVENOUS at 10:16

## 2024-10-01 NOTE — LACTATION NOTE
This note was copied from a baby's chart.  CONSULT - LACTATION  Baby Girl (Efrain Rivas 0 days female MRN: [id number]    [facility] AN NURSERY Room / Bed: (N)/(N) Encounter: [id number]    Maternal Information     MOTHER:  Marybel Rivas  Maternal Age: 29 y.o.  OB History: # 1 - Date: , Sex: Female, Weight: None, GA: None, Type: Vaginal, Spontaneous, Apgar1: None, Apgar5: None, Living: , Birth Comments: None    # 2 - Date: 10/01/24, Sex: Female, Weight: 2935 g (6 lb 7.5 oz), GA: 38w2d, Type: , Low Transverse, Apgar1: 9, Apgar5: 9, Living: Living, Birth Comments: None   Previouse breast reduction surgery? No    Lactation history:   Has patient previously breast fed: Yes   How long had patient previously breast fed: 3 years with first child   Previous breast feeding complications: None   No past surgical history on file.    Birth information:  YOB: 2023   Time of birth: 10:26 AM   Sex: female   Delivery type: , Low Transverse   Birth Weight: 2935 g (6 lb 7.5 oz)   Percent of Weight Change: 0%     Gestational Age: 38w2d   [unfilled]    Assessment     Breast and nipple assessment:  small breasts with nipples that meliton     Sunol Assessment: normal assessment    Feeding assessment: feeding well  LATCH:  Latch: Grasps breast, tongue down, lips flanged, rhythmic sucking   Audible Swallowing: Spontaneous and intermittent (24 hours old)   Type of Nipple: Everted (After stimulation)   Comfort (Breast/Nipple): Soft/non-tender   Hold (Positioning): Partial assist, teach one side, mother does other, staff holds   LATCH Score: 9           10/01/24 1415   Lactation Consultation   Reason for Consult 20;20 min   Risk Factors Language barrier  ( [id number])   Maternal Information   Has mother  before? Yes   How long did the the mother previously breastfeed? 3 years with first child   Previous Maternal Breastfeeding  Challenges None   Infant to breast within first hour of birth? No   Breastfeeding Delayed Due to Maternal status   LATCH Documentation   Latch 2   Audible Swallowing 2   Type of Nipple 2   Comfort (Breast/Nipple) 2   Hold (Positioning) 1   LATCH Score 9   Having latch problems? No   Position(s) Used Laid Back;Football   Breasts/Nipples   Left Breast Soft   Right Breast Soft   Left Nipple Everted   Right Nipple Everted   Intervention Hand expression   Breastfeeding Status Yes   Breastfeeding Progress Not yet established   Breast Pump   Pump 1  (no insurance; hand pump given)   Patient Follow-Up   Lactation Consult Status 2   Follow-Up Type Inpatient;Call as needed   Other OB Lactation Documentation    Additional Problem Noted Hand expression demonstrated; effective for drops. Mom has not latched baby at breast since delivery. Demonstration with teach back of football hold on right breast. Baby latched deeply. Once finished transitioned to left breast in laid back position. Baby latched deeply with active sucks and swallows. Reviewed proper position and alignment for deep latch. Reviewed hunger/fullness cues. Enc to call for further assistance.  (RSB booklet reviewed; D/C booklet at bedside)     Feeding recommendations:  breast feed on demand    Provided demonstration, education and support of deep latch to breast by placing the nipple to the nose, dragging down to chin to achieve a wide latch. Bring baby to the breast, not breast to baby. Move your shoulders down and away from your ears. Look for ear, shoulder, hip alignment. Baby's upper and lower lip should be flanged on the breast.    Met with mother. Provided mother with Ready, Set, Baby booklet which contained information on:  Hand expression with access to QR codes to review hand expression.  Positioning and latch reviewed as well as showing images of other feeding positions.  Discussed the properties of a good latch in any position.   Feeding on cue and what  that means for recognizing infant's hunger, s/s that baby is getting enough milk and some s/s that breastfeeding dyad may need further help  Skin to Skin contact an benefits to mom and baby  Avoidance of pacifiers for the first month discussed.   Gave information on common concerns, what to expect the first few weeks after delivery, preparing for other caregivers, and how partners can help. Resources for support also provided.    Encouraged parents to call for assistance, questions, and concerns about breastfeeding.  Extension provided.      Jennifer Montague MA 10/1/2024 3:02 PM

## 2024-10-01 NOTE — PROGRESS NOTES
L&D Triage Note - OB/GYN  Marybel Rivas 29 y.o. female MRN: [id number]  Unit/Bed#: LD TRIAGE  Encounter: [id number]    Patient is seen by ***    ASSESSMENT  Marybel Rivas is a 29 y.o.  at 38w2d who presented for ***. She is stable with workup not suggestive of *** at this time.    No new Assessment & Plan notes have been filed under this hospital service since the last note was generated.  Service: OB/GYN      #Discharge instructions  - patient instructed to call/return if experiencing worsening contractions, vaginal bleeding, loss of fluid, or decreased fetal movement  - next OB appointment: ***    Patient discussed with attending physician  ***  ______________    SUBJECTIVE  TEJAS: Estimated Date of Delivery: 10/13/24  HPI:  29 y.o.  @38w2d with history of HSV2 presents with complaint of ***. This pregnancy is complicated by the above medical history.     Contractions: ***  Leakage of fluid: ***  Vaginal Bleeding: ***  Fetal movement: ***present    R/o pre-term labor <34 wks:  ROM:  - infection screen  - speculum: pooling, dry slide, wet mount  - nitrazene  - cervical length  - SVE (NO check for PPROM)  - abd exam (chorio)  - urine dipstick w/ reflex to cx, GC/CT    ROM (NO check for PPROM):  - speculum: pooling, dry slide  - nitrazene  - SVE  - abd exam (chorio)  - TAUS: DANITZA, presentation, placenta    R/o labor >34 wks:  - SVE x2    Vaginal bleeding:  - last coitus  - abd exam  - speculum: bleeding  - TAUS: retroplacental, subchorionic, preplacental hemorrhage  - CBC, T&S  - KB, PT-INR, APTT, fibrinogen    Decreased FM:  - started when  - modified BPP  - note fetal movement and cardiac activity on ultrasound    Trauma:  - time  - description  - IPV screen  - monitor x4hrs  - abd exam (bruising)    OBJECTIVE:  Vitals:  LMP 2023 (Exact Date)   There is no height or weight on file to calculate BMI.    Physical Exam    Microscopy:   Infection:   - *** clue cells    - *** hyphae   - ***  trichomonads present   Membrane status:   - *** ferning   - *** nitrazene   - *** pooling     FHT:       TOCO:        IMAGING:       TVUS   Cervical length         - *** cm         - *** cm         - *** cm   Average: ***cm   Presentation: ***        TAUS   DANITZA      - Q1 *** cm     - Q2 *** cm     - Q3 *** cm     - Q4 *** cm     - Total: *** cm   Placenta: ***   Presentation: ***    Labs:   Recent Results (from the past 24 hour(s))   POCT wet mount    Collection Time: 09/30/24 10:39 AM   Result Value Ref Range    WET MOUNT neg     Yeast, Wet Prep neg     pH 5.0     Whiff Test neg     Clue Cells neg     Trich, Wet Prep neg        Catherine Faith MD  OBGYN PGY-1  10/01/24  9:15 AM

## 2024-10-01 NOTE — QUICK NOTE
Presented to bedside for complaint of post-operative nausea and vomiting. Patient reports she has vomited three times since her  section. She has not eaten since the procedure. There is no blood in the vomit. She reports mild dizziness but otherwise feels well and denies post-surgical pain, bleeding, shortness of breath, or chest pain.    On exam, she is in no acute distress. Cardiovascular exam regular rate and rhythm, lungs clear to auscultation bilaterally, abdomen soft, nondistended, uterus firm at the umbilicus.    - repeat vital signs  - recommended clear liquids only at this time  - head of bed raised to 70 degrees  - reglan ordered    Catherine Faith MD  OBGYN PGY-1  10/01/24  3:26 PM

## 2024-10-01 NOTE — ANESTHESIA PROCEDURE NOTES
Spinal Block    Patient location during procedure: OB/L&D  Start time: 10/1/2024 10:13 AM  Reason for block: procedure for pain and primary anesthetic  Staffing  Performed by: Danie Jenkins CRNA  Authorized by: Rafael Rivera MD    Preanesthetic Checklist  Completed: patient identified, IV checked, site marked, risks and benefits discussed, surgical consent, monitors and equipment checked, pre-op evaluation and timeout performed  Spinal Block  Patient position: sitting  Prep: ChloraPrep  Patient monitoring: frequent blood pressure checks, continuous pulse ox, cardiac monitor and heart rate  Approach: midline  Location: L3-4  Needle  Needle type: Pencil Point   Needle gauge: 24 G  Needle length: 4 in  Assessment  Injection Assessment:  negative aspiration for heme, positive aspiration for clear CSF and no paresthesia on injection.  Post-procedure:  site cleaned  Additional Notes  Spinal placed with pt in the sitting position in the OR. Pt tolerated procedure well, one attempt. Clear CSF aspirated pre and post injection of spinal medication. Level checked and is at the adequate level for the surgical procedure. Will continue to monitor closely. No complications

## 2024-10-01 NOTE — ASSESSMENT & PLAN NOTE
SVE 7 cm, plan to move to OR For 1LTCS   Preop Antibiotics: Ancef 1 gram and Azithromycin 500 mg   FEN: NPO    cc/hr   Admission labs: CBC/Type and Screen/ Syphilis Screen  Contraception: IUD at 6 weeks postpartum

## 2024-10-01 NOTE — H&P
H & P- Obstetrics   Marybel Rivas 29 y.o. female MRN: [id number]  Unit/Bed#: LD TRIAGE  Encounter: [id number]    Assessment: 29 y.o.  at 38w2d admitted for 1LTCS in the setting of active labor and HSV lesions.      Plan:   Labial lesion  Assessment & Plan  HSV culture pending however lesions suspicious for active HSV lesion   In the setting of active labor plan to proceed to OR for 1LTCS     38 weeks gestation of pregnancy  Assessment & Plan  SVE 7 cm, plan to move to OR For 1LTCS   Preop Antibiotics: Ancef 1 gram and Azithromycin 500 mg   FEN: NPO    cc/hr   Admission labs: CBC/Type and Screen/ Syphilis Screen  Contraception: IUD at 6 weeks postpartum           Discussed case and plan w/ Dr. Hernandez      Chief Complaint: contractions    HPI: Marybel Rivas is a 29 y.o.  with an TEJAS of 10/13/2024, by Ultrasound at 38w2d who is being admitted for 1LTCS in the setting of active labor and active HSV lesions.     Patient Active Problem List   Diagnosis    38 weeks gestation of pregnancy    Third trimester pregnancy    Language barrier    Abnormal glucose tolerance in pregnancy    Vertex presentation of fetus    Decreased fetal movement    Labial lesion       Baby complications/comments: none    Review of Systems   Gastrointestinal:  Positive for abdominal pain.       OB Hx:  OB History    Para Term  AB Living   2 1 1     0   SAB IAB Ectopic Multiple Live Births           1      # Outcome Date GA Lbr Danny/2nd Weight Sex Type Anes PTL Lv   2 Current            1 Term 2012    F Vag-Spont   DEC       Past Medical Hx:  No past medical history on file.    Past Surgical hx:  No past surgical history on file.    No Known Allergies      Medications Prior to Admission:     Prenatal Vit-Fe Fumarate-FA (Prenatal Plus Vitamin/Mineral) 27-1 MG TABS    valACYclovir (VALTREX) 1,000 mg tablet    Objective:  Temp:  [99 °F (37.2 °C)] 99 °F (37.2 °C)  HR:  [79] 79  Resp:  [20] 20  BP: (123)/(79)  "123/79  Body mass index is 30.1 kg/m².     Physical Exam:  Physical Exam  Constitutional:       General: She is in acute distress.      Comments: Uncomfortable with contractions   HENT:      Head: Normocephalic and atraumatic.   Cardiovascular:      Rate and Rhythm: Normal rate.   Pulmonary:      Effort: Pulmonary effort is normal.   Abdominal:      Palpations: Abdomen is soft.      Tenderness: There is no abdominal tenderness.      Comments: Gravid   Neurological:      General: No focal deficit present.      Mental Status: She is alert.   Skin:     General: Skin is warm and dry.   Psychiatric:         Mood and Affect: Mood normal.            FHT:  Baseline 150   Variability: minimal to moderate     TOCO:   Contraction Intensity: Mild/Moderate  Q2 mins     Lab Results   Component Value Date    WBC 11.08 (H) 10/01/2024    HGB 12.0 10/01/2024    HCT 36.2 10/01/2024     10/01/2024     No results found for: \"NA\", \"K\", \"CL\", \"CO2\", \"BUN\", \"CREATININE\", \"GLUCOSE\", \"AST\", \"ALT\"  Prenatal Labs: Reviewed      Blood type: O positive  Antibody: negative  GBS: negative  HIV: non-reactive  Rubella: immune  Syphilis IgM/IgG: non-reactive  HBsAg: non-reactive  HCAb: non-reactive  Chlamydia: negative  Gonorrhea: negative  Diabetes 1 hour screen: 151  3 hour glucose: 83/133/155/106  Platelets: 258k  Hgb: 11.0 g/DL  >2 Midnights  INPATIENT     Signature/Title: Rashmi Rodriguez MD  Date: 10/1/2024  Time: 10:05 AM    "

## 2024-10-01 NOTE — ASSESSMENT & PLAN NOTE
HSV culture pending however lesions suspicious for active HSV lesion   In the setting of active labor plan to proceed to OR for 1LTCS

## 2024-10-01 NOTE — DISCHARGE SUMMARY
Discharge Summary - Marybel Rivas 29 y.o. female MRN: [id number]    Unit/Bed#: LD PACU-03 Encounter: [id number]    Admission Date: 10/1/2024     Discharge Date: 10/3/2024    Admitting Attending: Diomedes Hernandez MD   Delivering Attending: Diomedes Hernandez MD   Discharge Attending: Diomedes Hernandez MD    Diagnosis:  Pregnancy at 38 weeks of gestation   Labial Lesion  Abnormal glucose tolerance test   Primary Low Transverse    Positive HSV IgG    Procedures:  low transverse  section     Complications: none apparent     Pt is a 30yo  who was admitted for 1LTCS in the setting of active labor and suspicion for active HSV outbreak at 38w2d. She underwent an uncomplicated  delivery.  She delivered a viable female  at 1026 on 10/1/2024. Weight was 6lbs 7.5oz (2935g) with APGARs of 9 and 9 at 1 and 5 minutes. Her preoperative Hb was 12.0. Her postoperative Hb was 9.1.  Her postoperative course was uncomplicated.     Condition at discharge: good     On day of discharge pain was well controlled, patient was tolerating PO, passing flatus, did not have a bowel movement. She was discharged with standard post partum/ post operative instructions to follow up with her physician in 1 week for an incision check and in 3-6 weeks for a postpartum appointment.     Discharge instructions/Information to patient and family:   -Do not place anything (no partner, tampons or douche) in your vagina for 6 weeks.  -You may walk for exercise for the first 6 weeks then gradually return to your usual activities.   -Please do not drive for 1 week if you have no stitches and for 2 weeks if you have stitches or underwent a  delivery.    -You may take baths or shower per your preference.   -Please look at your bust (breasts) in the mirror daily and call for redness or tenderness or increased warmth.   -Please call us for temperature > 100.4*F or 38* C, worsening pain or a foul discharge.       Discharge Medications:   Prenatal vitamin daily for 6 months or the duration of nursing whichever is longer.  Motrin 600 mg orally every 6 hours as needed for pain  Tylenol (over the counter) per bottle directions as needed for pain: do NOT use with percocet  Hydrocortisone cream 1% (over the counter) applied 1-2x daily to hemorrhoids as needed  Percocet as needed    Provisions for Follow-Up Care:  Follow up with your doctor in 1 week for incision site check.     Planned Readmission: massimo Burger  PGY-1 OB/GYN  10/03/24  6:41 AM

## 2024-10-01 NOTE — INTERIM OP NOTE
SECTION ()  Postoperative Note  PATIENT NAME: Marybel Rivas  : 1995  MRN: [id number]  AN L&D OR ROOM 02    Surgery Date: 10/1/2024    Preop Diagnosis:  S/P primary low transverse  [Z98.891]    Post-Op Diagnosis Codes:     * S/P primary low transverse  [Z98.891]    Procedure(s) (LRB):   SECTION () (N/A)    Surgeons and Role:     * Diomedes Hernandez MD - Primary     * Rashmi Rodriguez MD - Assisting    Specimens:  ID Type Source Tests Collected by Time Destination   1 :  Tissue (Placenta on Hold) OB Only Placenta TISSUE EXAM OB (PLACENTA) ONLY Diomedes Hernandez MD 10/1/2024 1029    A :  Cord Blood Cord BLOOD GAS, VENOUS, CORD, BLOOD GAS, ARTERIAL, CORD Diomedes Hernandez MD 10/1/2024 1029        Surgical QBL:   No data recorded   ml  Anesthesia Type:   Spinal    Findings:   VTX, clear fluid, viable female .  Normal uterus, tubes, and ovaries.  Separate serosal tare 2cm long, shallow,  noted just above the uterine incision on the patient's left that required 2 mattress sutures for hemostasis.  Nuknit was placed over serosal tare and uterine incision.    Complications:   None      SIGNATURE: Diomedes Hernandez MD   DATE: 2024   TIME: 10:54 AM

## 2024-10-01 NOTE — PLAN OF CARE
Problem: ANTEPARTUM  Goal: Maintain pregnancy as long as maternal and/or fetal condition is stable  Description: INTERVENTIONS:  - Maternal surveillance  - Fetal surveillance  - Monitor uterine activity  - Medications as ordered  - Bedrest  Outcome: Progressing     Problem: BIRTH - VAGINAL/ SECTION  Goal: Fetal and maternal status remain reassuring during the birth process  Description: INTERVENTIONS:  - Monitor vital signs  - Monitor fetal heart rate  - Monitor uterine activity  - Monitor labor progression (vaginal delivery)  - DVT prophylaxis  - Antibiotic prophylaxis  Outcome: Progressing  Goal: Emotionally satisfying birthing experience for mother/fetus  Description: Interventions:  - Assess, plan, implement and evaluate the nursing care given to the patient in labor  - Advocate the philosophy that each childbirth experience is a unique experience and support the family's chosen level of involvement and control during the labor process   - Actively participate in both the patient's and family's teaching of the birth process  - Consider cultural, Mandaen and age-specific factors and plan care for the patient in labor  Outcome: Progressing     Problem: POSTPARTUM  Goal: Experiences normal postpartum course  Description: INTERVENTIONS:  - Monitor maternal vital signs  - Assess uterine involution and lochia  Outcome: Progressing  Goal: Appropriate maternal -  bonding  Description: INTERVENTIONS:  - Identify family support  - Assess for appropriate maternal/infant bonding   -Encourage maternal/infant bonding opportunities  - Referral to  or  as needed  Outcome: Progressing  Goal: Establishment of infant feeding pattern  Description: INTERVENTIONS:  - Assess breast/bottle feeding  - Refer to lactation as needed  Outcome: Progressing  Goal: Incision(s), wounds(s) or drain site(s) healing without S/S of infection  Description: INTERVENTIONS  - Assess and document dressing,  incision, wound bed, drain sites and surrounding tissue  - Provide patient and family education  - Perform skin care/dressing changes    Outcome: Progressing     Problem: PAIN - ADULT  Goal: Verbalizes/displays adequate comfort level or baseline comfort level  Description: Interventions:  - Encourage patient to monitor pain and request assistance  - Assess pain using appropriate pain scale  - Administer analgesics based on type and severity of pain and evaluate response  - Implement non-pharmacological measures as appropriate and evaluate response  - Consider cultural and social influences on pain and pain management  - Notify physician/advanced practitioner if interventions unsuccessful or patient reports new pain  Outcome: Progressing     Problem: INFECTION - ADULT  Goal: Absence or prevention of progression during hospitalization  Description: INTERVENTIONS:  - Assess and monitor for signs and symptoms of infection  - Monitor lab/diagnostic results  - Monitor all insertion sites, i.e. indwelling lines, tubes, and drains  - Monitor endotracheal if appropriate and nasal secretions for changes in amount and color  - [place] appropriate cooling/warming therapies per order  - Administer medications as ordered  - Instruct and encourage patient and family to use good hand hygiene technique  - Identify and instruct in appropriate isolation precautions for identified infection/condition  Outcome: Progressing  Goal: Absence of fever/infection during neutropenic period  Description: INTERVENTIONS:  - Monitor WBC    Outcome: Progressing     Problem: SAFETY ADULT  Goal: Patient will remain free of falls  Description: INTERVENTIONS:  - Educate patient/family on patient safety including physical limitations  - Instruct patient to call for assistance with activity   - Consult OT/PT to assist with strengthening/mobility   - Keep Call bell within reach  - Keep bed low and locked with side rails adjusted as appropriate  - Keep  care items and personal belongings within reach  - Initiate and maintain comfort rounds  - Make Fall Risk Sign visible to staff  - Offer Toileting every 2 Hours, in advance of need     - Apply yellow socks and bracelet for high fall risk patients  - Consider moving patient to room near nurses station  Outcome: Progressing  Goal: Maintain or return to baseline ADL function  Description: INTERVENTIONS:  -  Assess patient's ability to carry out ADLs; assess patient's baseline for ADL function and identify physical deficits which impact ability to perform ADLs (bathing, care of mouth/teeth, toileting, grooming, dressing, etc.)  - Assess/evaluate cause of self-care deficits   - Assess range of motion  - Assess patient's mobility; develop plan if impaired  - Assess patient's need for assistive devices and provide as appropriate  - Encourage maximum independence but intervene and supervise when necessary  - Involve family in performance of ADLs  - Assess for home care needs following discharge   - Consider OT consult to assist with ADL evaluation and planning for discharge  - Provide patient education as appropriate  Outcome: Progressing  Goal: Maintains/Returns to pre admission functional level  Description: INTERVENTIONS:  - Perform AM-PAC 6 Click Basic Mobility/ Daily Activity assessment daily.  - Set and communicate daily mobility goal to care team and patient/family/caregiver.   - Collaborate with rehabilitation services on mobility goals if consulted  - Perform Range of Motion 2 times a day.  - Reposition patient every 3 hours.  - Dangle patient 3 times a day  - Stand patient 3 times a day  - Ambulate patient 3 times a day  - Out of bed to chair 3 times a day   - Out of bed for meals 3 times a day  - Out of bed for toileting  - Record patient progress and toleration of activity level   Outcome: Progressing     Problem: Knowledge Deficit  Goal: Patient/family/caregiver demonstrates understanding of disease process,  treatment plan, medications, and discharge instructions  Description: Complete learning assessment and assess knowledge base.  Interventions:  - Provide teaching at level of understanding  - Provide teaching via preferred learning methods  Outcome: Progressing     Problem: DISCHARGE PLANNING  Goal: Discharge to home or other facility with appropriate resources  Description: INTERVENTIONS:  - Identify barriers to discharge w/patient and caregiver  - Arrange for needed discharge resources and transportation as appropriate  - Identify discharge learning needs (meds, wound care, etc.)  - Arrange for interpretive services to assist at discharge as needed  - Refer to Case Management Department for coordinating discharge planning if the patient needs post-hospital services based on physician/advanced practitioner order or complex needs related to functional status, cognitive ability, or social support system  Outcome: Progressing

## 2024-10-01 NOTE — ANESTHESIA PREPROCEDURE EVALUATION
Procedure:   SECTION () (Uterus)    Relevant Problems   ANESTHESIA (within normal limits)      CARDIO (within normal limits)      ENDO (within normal limits)      GI/HEPATIC (within normal limits)      /RENAL (within normal limits)      GYN   (+) 38 weeks gestation of pregnancy      HEMATOLOGY (within normal limits)      MUSCULOSKELETAL (within normal limits)      NEURO/PSYCH (within normal limits)      PULMONARY (within normal limits)        Physical Exam    Airway    Mallampati score: III  TM Distance: >3 FB  Neck ROM: full     Dental   No notable dental hx     Cardiovascular  Rhythm: regular, Rate: normal, Cardiovascular exam normal    Pulmonary  Pulmonary exam normal Breath sounds clear to auscultation    Other Findings  post-pubertal.      Anesthesia Plan  ASA Score- 2 Emergent    Anesthesia Type- spinal with ASA Monitors.         Additional Monitors:     Airway Plan:     Comment: 28yo F w/ PMHx HSV presenting in spontaneous labor, 7cm dilated w/ active labial lesion now s/f LTCS urgently w/ Dr. Hernandez.     Plan: Spinal anesthetic, PIVx1, standard ASA monitors.    Anesthesia plan and consent discussed with patient with assistance of . Patient expressed understanding and agreement. Risks/benefits and alternatives discussed with patient including possible PONV, sore throat, damage to teeth/lips/gums and possibility of rare anesthetic and surgical emergencies. Plan discussed and confirmed with CRNA.   .       Plan Factors-Exercise tolerance (METS): >4 METS.    Chart reviewed. EKG reviewed. Imaging results reviewed. Existing labs reviewed. Patient summary reviewed.    Patient is not a current smoker.  Patient instructed to abstain from smoking on day of procedure. Patient did not smoke on day of surgery.            Induction-     Postoperative Plan-     Perioperative Resuscitation Plan - Level 1 - Full Code.       Informed Consent- Anesthetic plan and risks discussed with  patient and spouse.  I personally reviewed this patient with the CRNA. Discussed and agreed on the Anesthesia Plan with the CRNA..

## 2024-10-01 NOTE — OP NOTE
OPERATIVE REPORT  PATIENT NAME: Marybel Rivas    :  1995  MRN: [id number]  Pt Location: [facility] L&D OR ROOM 02    SURGERY DATE: 10/1/2024    Surgeons and Role:     * Diomedes Hernandez MD - Primary     * Rashmi Rodriguez MD - Assisting    Preop Diagnosis:  S/P primary low transverse  [Z98.891]  Pregnancy at 38 weeks of gestation   Labor   Labial Lesion    Post-Op Diagnosis Codes:     * S/P primary low transverse  [Z98.891]    Procedure(s) (LRB):   SECTION () (N/A)    Specimen(s):  ID Type Source Tests Collected by Time Destination   1 :  Tissue (Placenta on Hold) OB Only Placenta TISSUE EXAM OB (PLACENTA) ONLY Diomedes Hernandez MD 10/1/2024 1029    A :  Cord Blood Cord BLOOD GAS, VENOUS, CORD, BLOOD GAS, ARTERIAL, CORD Diomedes Hernandez MD 10/1/2024 1029        Surgical QBL:  Surgical QBL (mL): 388 mL      Drains:  Cartwright Catheter     IV Fluids:   LR 1000 mL     Anesthesia Type:   Spinal     Operative Indications:  S/P primary low transverse  [Z98.891]  Pregnancy at 38 weeks of gestation   Labor   Labial Lesion     Jaylen Group Classification System:  No Multiple pregnancy, No Transverse or oblique lie, No Breech lie, Gestational age is > or =37 weeks, Multiparous, No Previous uterine scar, Spontaneous Labor +  is JAYLEN GROUP 3    Complications:   None Apparent     Operative Findings:  1. Viable female  at 1026 with APGARs of 9 and 9 at 1 and 5 minutes. Fetus weighted 6lb 7.5oz.  2. Normal intact placenta with centrally inserted 3VC.  3. Normal bilateral tubes and ovaries.  4. Normal appearing uterus, 3 cm serosal defect on the anterior surface of the uterus from uterine manipulation which was hemostatic after repair and nuknit placement     Umbilical Cord Venous Blood Gas:  Results from last 7 days   Lab Units 10/01/24  1029   PH COV  7.344   PCO2 COV mm HG 42.2   HCO3 COV mmol/L 22.5   BASE EXC COV mmol/L -3.1*   O2 CT CD VB mL/dL 17.3    O2 HGB, VENOUS CORD % 74.4     Umbilical Cord Arterial Blood Gas:  Results from last 7 days   Lab Units 10/01/24  1029   PH COA  7.313   PCO2 COA  52.3   PO2 COA mm HG 16.3   HCO3 COA mmol/L 25.9   BASE EXC COA mmol/L -1.3*   O2 CONTENT CORD ART ml/dl 7.7   O2 HGB, ARTERIAL CORD % 32.5       Brief History and Labor Course:   Marybel Rivas is a 30 yo now  in active labor at 38 weeks and 2 days.  There was concern for active HSV lesion suspicious for primary outbreak on her labia.  In the setting of suspicion for active HSV lesions, multiparous status and active labor the decision was made to proceed with primary low-transverse  section in urgent fashion.    Procedure and Technique:  The patient was taken to the operating room. Spinal anesthesia was adequately established and 1 gram of ancef and 500 mg of azithromycin were given for preoperative prophylaxis. The patient was then placed in the dorsal supine position with a left tilt of the hips. The patient was then prepped with betadine for vaginal prep and chloraprep for abdominal prep and draped in the usual sterile fashion for a Pfannenstiel skin incision.      A time out was performed to confirm correct patient and correct procedure.     A Pfannenstiel incision was made and carried down through the underlying subcutaneous tissue to the fascia using a scalpel. Rectus fascia was then incised. We then proceeded in Efra-Marin fashion. All anatomic layers were well-demarcated. The rectus muscles were  and the peritoneum was identified, entered, and extended longitudinally with blunt dissection.     The jori retractor was inserted and a transverse incision was made in the lower uterine segment using a new surgical blade. The uterine incision was extended cephalad and caudal using blunt dissection. The amniotic sac was entered and the amniotic fluid was noted to be clear.    The surgeon's hand was placed into the uterine cavity. The fetal  head was identified and elevated through the uterine incision with the assistance of fundal pressure. With gentle traction, the shoulder was delivered, followed by the rest of the fetal body. There was no nuchal cord noted. On delivery the cord was doubly clamped and cut after delayed cord clamping. The infant was then passed off the table to the awaiting  staff. The  was noted to cry spontaneously and moved all extremities. Venous and arterial blood gas, cord blood, and portion of cord was obtained for analysis and routine blood testing. The placenta delivery was then sent to storage. Placenta was noted to be intact with a centrally inserted three-vessel cord.  Oxytocin was administered by IV infusion to enhance uterine contraction. The uterus was cleared of all clots and remaining products of conception.      The uterine incision was re approximated using a 0-monocryl in a running locked fashion. A second horizontal imbricating stitch with 0-monocryl was applied. The uterine incision was examined and noted to be hemostatic.     There was noted to be a 3 cm area of serosal defect on the anterior surface of the uterus which was bleeding.  Using 0 Monocryl 2 vertical mattress sutures were placed and hemostasis was achieved.  Copious irrigation was performed and there was confirmed to be no active blush.  Nu-Knit was placed over the hysterotomy and serosal defect.     The fascia was re approximated using 0-vicryl in a running nonlocked fashion starting at the bilateral apices and meeting at the midline. The subcutaneous tissue was irrigated and cleared of all clots and debris. Good hemostasis was noted. The subcutaneous tissue was reapproximated with 2-0 plain. The skin incision was closed using 4-0 monocryl and covered with exofin skin adhesive. Good hemostasis was noted. Patient tolerated the procedure well. All needle, sponge, and instrument counts were noted to be correct x 2 at the end of the  procedure.  Patient was transferred to the recovery room in stable condition. Dr. Hernandez was present for the procedure.      Patient Disposition:  PACU         SIGNATURE: Rashmi Rodriguez MD  DATE: October 1, 2024  TIME: 11:12 AM

## 2024-10-02 PROBLEM — Z98.891 S/P CESAREAN SECTION: Status: ACTIVE | Noted: 2024-10-02

## 2024-10-02 LAB
BASOPHILS # BLD AUTO: 0.03 THOUSANDS/ΜL (ref 0–0.1)
BASOPHILS NFR BLD AUTO: 0 % (ref 0–1)
EOSINOPHIL # BLD AUTO: 0.04 THOUSAND/ΜL (ref 0–0.61)
EOSINOPHIL NFR BLD AUTO: 0 % (ref 0–6)
ERYTHROCYTE [DISTWIDTH] IN BLOOD BY AUTOMATED COUNT: 14.2 % (ref 11.6–15.1)
HCT VFR BLD AUTO: 27.7 % (ref 34.8–46.1)
HGB BLD-MCNC: 9.1 G/DL (ref 11.5–15.4)
HSV2 IGG SERPL QL IA: POSITIVE
HSV2 IGG SERPL QL IA: POSITIVE
IMM GRANULOCYTES # BLD AUTO: 0.08 THOUSAND/UL (ref 0–0.2)
IMM GRANULOCYTES NFR BLD AUTO: 1 % (ref 0–2)
LYMPHOCYTES # BLD AUTO: 2.25 THOUSANDS/ΜL (ref 0.6–4.47)
LYMPHOCYTES NFR BLD AUTO: 22 % (ref 14–44)
MCH RBC QN AUTO: 26.7 PG (ref 26.8–34.3)
MCHC RBC AUTO-ENTMCNC: 32.3 G/DL (ref 31.4–37.4)
MCV RBC AUTO: 83 FL (ref 82–98)
MONOCYTES # BLD AUTO: 0.75 THOUSAND/ΜL (ref 0.17–1.22)
MONOCYTES NFR BLD AUTO: 7 % (ref 4–12)
NEUTROPHILS # BLD AUTO: 7.28 THOUSANDS/ΜL (ref 1.85–7.62)
NEUTS SEG NFR BLD AUTO: 70 % (ref 43–75)
NRBC BLD AUTO-RTO: 0 /100 WBCS
PLATELET # BLD AUTO: 209 THOUSANDS/UL (ref 149–390)
PMV BLD AUTO: 10.4 FL (ref 8.9–12.7)
RBC # BLD AUTO: 3.37 MILLION/UL (ref 3.81–5.12)
WBC # BLD AUTO: 10.43 THOUSAND/UL (ref 4.31–10.16)

## 2024-10-02 PROCEDURE — 85025 COMPLETE CBC W/AUTO DIFF WBC: CPT

## 2024-10-02 PROCEDURE — 99024 POSTOP FOLLOW-UP VISIT: CPT | Performed by: OBSTETRICS & GYNECOLOGY

## 2024-10-02 RX ORDER — IBUPROFEN 600 MG/1
600 TABLET, FILM COATED ORAL EVERY 6 HOURS PRN
Status: DISCONTINUED | OUTPATIENT
Start: 2024-10-02 | End: 2024-10-04 | Stop reason: HOSPADM

## 2024-10-02 RX ORDER — ACETAMINOPHEN 325 MG/1
975 TABLET ORAL EVERY 6 HOURS SCHEDULED
Status: DISCONTINUED | OUTPATIENT
Start: 2024-10-03 | End: 2024-10-04 | Stop reason: HOSPADM

## 2024-10-02 RX ORDER — IBUPROFEN 600 MG/1
600 TABLET, FILM COATED ORAL EVERY 6 HOURS
Qty: 30 TABLET | Refills: 0 | Status: CANCELLED | OUTPATIENT
Start: 2024-10-04

## 2024-10-02 RX ORDER — ACETAMINOPHEN 325 MG/1
650 TABLET ORAL EVERY 6 HOURS SCHEDULED
Qty: 30 TABLET | Refills: 0 | Status: CANCELLED | OUTPATIENT
Start: 2024-10-02

## 2024-10-02 RX ADMIN — ACETAMINOPHEN 650 MG: 325 TABLET ORAL at 17:30

## 2024-10-02 RX ADMIN — KETOROLAC TROMETHAMINE 30 MG: 30 INJECTION, SOLUTION INTRAMUSCULAR; INTRAVENOUS at 05:56

## 2024-10-02 RX ADMIN — DOCUSATE SODIUM 100 MG: 100 CAPSULE, LIQUID FILLED ORAL at 08:57

## 2024-10-02 RX ADMIN — ACETAMINOPHEN 650 MG: 325 TABLET ORAL at 03:36

## 2024-10-02 RX ADMIN — ACETAMINOPHEN 650 MG: 325 TABLET ORAL at 08:57

## 2024-10-02 RX ADMIN — DOCUSATE SODIUM 100 MG: 100 CAPSULE, LIQUID FILLED ORAL at 17:30

## 2024-10-02 RX ADMIN — VALACYCLOVIR HYDROCHLORIDE 1000 MG: 500 TABLET, FILM COATED ORAL at 09:29

## 2024-10-02 RX ADMIN — VALACYCLOVIR HYDROCHLORIDE 1000 MG: 500 TABLET, FILM COATED ORAL at 21:04

## 2024-10-02 RX ADMIN — IBUPROFEN 600 MG: 600 TABLET, FILM COATED ORAL at 21:04

## 2024-10-02 NOTE — ASSESSMENT & PLAN NOTE
[id number], Hgb 12 -->  am pending  Cartwright catheter out, passed VT   DVT ppx: SCD's  Continue routine post partum care  Encourage ambulation  Encourage breastfeeding  Contraception: IUO OP  Anticipate discharge PPD 03 vs 02

## 2024-10-02 NOTE — PROGRESS NOTES
"  Progress Note - OB/GYN   Name: Marybel Rivas 29 y.o. female I MRN: [id number]  Unit/Bed#: -01 I Date of Admission: 10/1/2024   Date of Service: 10/2/2024 I Hospital Day: 1     Assessment & Plan  S/P primary low transverse   [id number], Hgb 12 -->  am pending  Cartwright catheter out, passed VT   DVT ppx: SCD's  Continue routine post partum care  Encourage ambulation  Encourage breastfeeding  Contraception: IUO OP  Anticipate discharge PPD 03 vs 02   Labial lesion  HSV culture pending however lesions suspicious for active HSV lesion   In the setting of active labor plan to proceed to OR for 1LTCS     Progress Note - OB/GYN   Marybel Rivas 29 y.o. female MRN: [id number]  Unit/Bed#: -01 Encounter: [id number]    Assessment:  Post partum Day #01 s/p 1LTCS, stable, baby in the nursery    Plan:  S/P primary low transverse   Assessment & Plan  [id number], Hgb 12 -->  am pending  Cartwright catheter out, passed VT   DVT ppx: SCD's  Continue routine post partum care  Encourage ambulation  Encourage breastfeeding  Contraception: IUO OP  Anticipate discharge PPD 03 vs 02         Subjective/Objective   Chief Complaint:     Post delivery. Patient is doing well. Lochia WNL. Pain well controlled.     Subjective:     Pain: yes, cramping, improved with meds  Tolerating PO: yes  Voiding: yes  Flatus: yes  Ambulating: yes  Chest pain: no  Shortness of breath: no  Leg pain: no  Lochia: minimal    Objective:     Vitals: BP 96/53 (BP Location: Left arm)   Pulse 89   Temp 97.7 °F (36.5 °C) (Oral)   Resp 18   Ht 4' 10\" (1.473 m)   Wt 65.3 kg (144 lb)   LMP 2023 (Exact Date)   SpO2 98%   Breastfeeding Yes   BMI 30.10 kg/m²     I/O         09/30 0701  10/01 0700 10/01 0701  10/02 07    I.V. (mL/kg)  1000 (15.3)    Total Intake(mL/kg)  1000 (15.3)    Urine (mL/kg/hr)  2350    Emesis/NG output  250    Blood  388    Total Output  2988    Net  -                  Lab Results   Component Value Date    WBC " 11.08 (H) 10/01/2024    HGB 12.0 10/01/2024    HCT 36.2 10/01/2024    MCV 82 10/01/2024     10/01/2024       Physical Exam:     Gen: AAOx3, NAD  CV: no acute distress  Lungs: no acute distress  Abd: Soft, non-tender, non-distended, no rebound or guarding  Uterine fundus firm and non-tender, 01 cm above the umbilicus. Incision c/d/I  Ext: Non tender    Blanca Lizama MD  OBGYN PGY-1  10/2/2024  6:42 AM

## 2024-10-02 NOTE — PLAN OF CARE
Problem: ANTEPARTUM  Goal: Maintain pregnancy as long as maternal and/or fetal condition is stable  Description: INTERVENTIONS:  - Maternal surveillance  - Fetal surveillance  - Monitor uterine activity  - Medications as ordered  - Bedrest  Outcome: Completed     Problem: BIRTH - VAGINAL/ SECTION  Goal: Fetal and maternal status remain reassuring during the birth process  Description: INTERVENTIONS:  - Monitor vital signs  - Monitor fetal heart rate  - Monitor uterine activity  - Monitor labor progression (vaginal delivery)  - DVT prophylaxis  - Antibiotic prophylaxis  Outcome: Completed  Goal: Emotionally satisfying birthing experience for mother/fetus  Description: Interventions:  - Assess, plan, implement and evaluate the nursing care given to the patient in labor  - Advocate the philosophy that each childbirth experience is a unique experience and support the family's chosen level of involvement and control during the labor process   - Actively participate in both the patient's and family's teaching of the birth process  - Consider cultural, Yazidism and age-specific factors and plan care for the patient in labor  Outcome: Completed     Problem: POSTPARTUM  Goal: Experiences normal postpartum course  Description: INTERVENTIONS:  - Monitor maternal vital signs  - Assess uterine involution and lochia  Outcome: Progressing  Goal: Appropriate maternal -  bonding  Description: INTERVENTIONS:  - Identify family support  - Assess for appropriate maternal/infant bonding   -Encourage maternal/infant bonding opportunities  - Referral to  or  as needed  Outcome: Progressing  Goal: Establishment of infant feeding pattern  Description: INTERVENTIONS:  - Assess breast/bottle feeding  - Refer to lactation as needed  Outcome: Progressing  Goal: Incision(s), wounds(s) or drain site(s) healing without S/S of infection  Description: INTERVENTIONS  - Assess and document dressing, incision,  wound bed, drain sites and surrounding tissue  - Provide patient and family education  - Perform skin care/dressing changes    Outcome: Progressing     Problem: PAIN - ADULT  Goal: Verbalizes/displays adequate comfort level or baseline comfort level  Description: Interventions:  - Encourage patient to monitor pain and request assistance  - Assess pain using appropriate pain scale  - Administer analgesics based on type and severity of pain and evaluate response  - Implement non-pharmacological measures as appropriate and evaluate response  - Consider cultural and social influences on pain and pain management  - Notify physician/advanced practitioner if interventions unsuccessful or patient reports new pain  Outcome: Progressing     Problem: INFECTION - ADULT  Goal: Absence or prevention of progression during hospitalization  Description: INTERVENTIONS:  - Assess and monitor for signs and symptoms of infection  - Monitor lab/diagnostic results  - Monitor all insertion sites, i.e. indwelling lines, tubes, and drains  - Monitor endotracheal if appropriate and nasal secretions for changes in amount and color  - [place] appropriate cooling/warming therapies per order  - Administer medications as ordered  - Instruct and encourage patient and family to use good hand hygiene technique  - Identify and instruct in appropriate isolation precautions for identified infection/condition  Outcome: Progressing  Goal: Absence of fever/infection during neutropenic period  Description: INTERVENTIONS:  - Monitor WBC    Outcome: Progressing     Problem: SAFETY ADULT  Goal: Patient will remain free of falls  Description: INTERVENTIONS:  - Educate patient/family on patient safety including physical limitations  - Instruct patient to call for assistance with activity   - Consult OT/PT to assist with strengthening/mobility   - Keep Call bell within reach  - Keep bed low and locked with side rails adjusted as appropriate  - Keep care items  and personal belongings within reach  - Initiate and maintain comfort rounds  - Make Fall Risk Sign visible to staff  - Offer Toileting every 2 Hours, in advance of need     - Apply yellow socks and bracelet for high fall risk patients  - Consider moving patient to room near nurses station  Outcome: Progressing  Goal: Maintain or return to baseline ADL function  Description: INTERVENTIONS:  -  Assess patient's ability to carry out ADLs; assess patient's baseline for ADL function and identify physical deficits which impact ability to perform ADLs (bathing, care of mouth/teeth, toileting, grooming, dressing, etc.)  - Assess/evaluate cause of self-care deficits   - Assess range of motion  - Assess patient's mobility; develop plan if impaired  - Assess patient's need for assistive devices and provide as appropriate  - Encourage maximum independence but intervene and supervise when necessary  - Involve family in performance of ADLs  - Assess for home care needs following discharge   - Consider OT consult to assist with ADL evaluation and planning for discharge  - Provide patient education as appropriate  Outcome: Progressing  Goal: Maintains/Returns to pre admission functional level  Description: INTERVENTIONS:  - Perform AM-PAC 6 Click Basic Mobility/ Daily Activity assessment daily.  - Set and communicate daily mobility goal to care team and patient/family/caregiver.   - Collaborate with rehabilitation services on mobility goals if consulted  - Perform Range of Motion 2 times a day.  - Reposition patient every 3 hours.  - Dangle patient 3 times a day  - Stand patient 3 times a day  - Ambulate patient 3 times a day  - Out of bed to chair 3 times a day   - Out of bed for meals 3 times a day  - Out of bed for toileting  - Record patient progress and toleration of activity level   Outcome: Progressing     Problem: Knowledge Deficit  Goal: Patient/family/caregiver demonstrates understanding of disease process, treatment  plan, medications, and discharge instructions  Description: Complete learning assessment and assess knowledge base.  Interventions:  - Provide teaching at level of understanding  - Provide teaching via preferred learning methods  Outcome: Progressing     Problem: DISCHARGE PLANNING  Goal: Discharge to home or other facility with appropriate resources  Description: INTERVENTIONS:  - Identify barriers to discharge w/patient and caregiver  - Arrange for needed discharge resources and transportation as appropriate  - Identify discharge learning needs (meds, wound care, etc.)  - Arrange for interpretive services to assist at discharge as needed  - Refer to Case Management Department for coordinating discharge planning if the patient needs post-hospital services based on physician/advanced practitioner order or complex needs related to functional status, cognitive ability, or social support system  Outcome: Progressing

## 2024-10-02 NOTE — PLAN OF CARE

## 2024-10-03 PROCEDURE — 99024 POSTOP FOLLOW-UP VISIT: CPT | Performed by: OBSTETRICS & GYNECOLOGY

## 2024-10-03 PROCEDURE — NC001 PR NO CHARGE: Performed by: OBSTETRICS & GYNECOLOGY

## 2024-10-03 RX ORDER — DOCUSATE SODIUM 100 MG/1
100 CAPSULE, LIQUID FILLED ORAL 2 TIMES DAILY
Status: CANCELLED
Start: 2024-10-03

## 2024-10-03 RX ADMIN — DOCUSATE SODIUM 100 MG: 100 CAPSULE, LIQUID FILLED ORAL at 18:35

## 2024-10-03 RX ADMIN — ACETAMINOPHEN 975 MG: 325 TABLET ORAL at 18:36

## 2024-10-03 RX ADMIN — ACETAMINOPHEN 975 MG: 325 TABLET ORAL at 06:23

## 2024-10-03 RX ADMIN — ACETAMINOPHEN 975 MG: 325 TABLET ORAL at 00:17

## 2024-10-03 RX ADMIN — DOCUSATE SODIUM 100 MG: 100 CAPSULE, LIQUID FILLED ORAL at 10:03

## 2024-10-03 RX ADMIN — IBUPROFEN 600 MG: 600 TABLET, FILM COATED ORAL at 10:03

## 2024-10-03 RX ADMIN — VALACYCLOVIR HYDROCHLORIDE 1000 MG: 500 TABLET, FILM COATED ORAL at 21:26

## 2024-10-03 RX ADMIN — VALACYCLOVIR HYDROCHLORIDE 1000 MG: 500 TABLET, FILM COATED ORAL at 10:03

## 2024-10-03 NOTE — PROGRESS NOTES
"  Progress Note - OB/GYN   Name: Marybel Rivas 29 y.o. female I MRN: [id number]  Unit/Bed#: -01 I Date of Admission: 10/1/2024   Date of Service: 10/3/2024 I Hospital Day: 2     Assessment & Plan  S/P primary low transverse   [id number], Hgb 12 -->  9.1  Spontaneously voiding  DVT ppx: SCD's  Continue routine post partum care  Encourage ambulation  Encourage breastfeeding  Contraception: IUD  Anticipate discharge PPD 03 vs 02   Labial lesion  +HSV    Marybel Rivas is a patient of: [facility]. She is PPD# 2 s/p  primary  section, low transverse incision in the setting of +HSV with an active lesion. Recovering well and is stable.    Disposition    - Anticipate discharge home on PPD# 2-3      Subjective/Objective     Chief Complaint: Postpartum State     Subjective:    Marybel Rivas is PPD/POD#2 s/p  primary  section, low transverse incision. She has no current complaints.  Pain is well controlled.  Patient is currently voiding.  She is ambulating.  Patient is currently passing flatus and has had no bowel movement. She is tolerating PO, and denies nausea or vomitting. Patient denies fever, chills, chest pain, shortness of breath, or calf tenderness. Lochia is minimal. She is  Breastfeeding. She is recovering well and is stable.       Vitals:   BP 99/55 (BP Location: Left arm)   Pulse 85   Temp 98.2 °F (36.8 °C) (Oral)   Resp 16   Ht 4' 10\" (1.473 m)   Wt 65.3 kg (144 lb)   LMP 2023 (Exact Date)   SpO2 97%   Breastfeeding Yes   BMI 30.10 kg/m²       Intake/Output Summary (Last 24 hours) at 10/3/2024 0543  Last data filed at 10/2/2024 0550  Gross per 24 hour   Intake --   Output 600 ml   Net -600 ml       Invasive Devices       None                   Physical Exam:   GEN: Marybel Rivas appears well, alert and oriented x 3, pleasant and cooperative   CARDIO: RRR, no murmurs or rubs  RESP:  CTAB, no wheezes or rales  ABDOMEN: soft, no " tenderness, no distention, fundus 1 cm below umbilicus, Incision C/D/I  EXTREMITIES: SCDs on, non tender, no erythema, b/l Greg's sign negative      Labs:     Hemoglobin   Date Value Ref Range Status   10/02/2024 9.1 (L) 11.5 - 15.4 g/dL Final     Comment:     Results verified by repeat   10/01/2024 12.0 11.5 - 15.4 g/dL Final     WBC   Date Value Ref Range Status   10/02/2024 10.43 (H) 4.31 - 10.16 Thousand/uL Final   10/01/2024 11.08 (H) 4.31 - 10.16 Thousand/uL Final     Platelets   Date Value Ref Range Status   10/02/2024 209 149 - 390 Thousands/uL Final   10/01/2024 264 149 - 390 Thousands/uL Final          Sally Burger MD  10/3/2024  5:43 AM

## 2024-10-03 NOTE — PLAN OF CARE

## 2024-10-03 NOTE — ASSESSMENT & PLAN NOTE
[id number], Hgb 12 -->  9.1  Spontaneously voiding  DVT ppx: SCD's  Continue routine post partum care  Encourage ambulation  Encourage breastfeeding  Contraception: IUD  Anticipate discharge PPD 03 vs 02

## 2024-10-03 NOTE — PLAN OF CARE

## 2024-10-04 VITALS
OXYGEN SATURATION: 98 % | TEMPERATURE: 98.7 F | BODY MASS INDEX: 30.23 KG/M2 | RESPIRATION RATE: 20 BRPM | HEIGHT: 58 IN | HEART RATE: 80 BPM | SYSTOLIC BLOOD PRESSURE: 98 MMHG | DIASTOLIC BLOOD PRESSURE: 62 MMHG | WEIGHT: 144 LBS

## 2024-10-04 PROBLEM — Z98.891 S/P PRIMARY LOW TRANSVERSE C-SECTION: Chronic | Status: ACTIVE | Noted: 2024-10-01

## 2024-10-04 PROBLEM — B00.9 HSV (HERPES SIMPLEX VIRUS) INFECTION: Status: ACTIVE | Noted: 2024-09-30

## 2024-10-04 PROCEDURE — 99024 POSTOP FOLLOW-UP VISIT: CPT | Performed by: OBSTETRICS & GYNECOLOGY

## 2024-10-04 RX ORDER — BENZOCAINE/MENTHOL 6 MG-10 MG
1 LOZENGE MUCOUS MEMBRANE DAILY PRN
Start: 2024-10-04

## 2024-10-04 RX ORDER — IBUPROFEN 600 MG/1
600 TABLET, FILM COATED ORAL EVERY 6 HOURS PRN
Qty: 30 TABLET | Refills: 0 | Status: SHIPPED | OUTPATIENT
Start: 2024-10-04

## 2024-10-04 RX ORDER — POLYETHYLENE GLYCOL 3350 17 G/17G
17 POWDER, FOR SOLUTION ORAL DAILY PRN
Start: 2024-10-04

## 2024-10-04 RX ORDER — ACETAMINOPHEN 325 MG/1
975 TABLET ORAL EVERY 6 HOURS SCHEDULED
Qty: 30 TABLET | Refills: 0 | Status: SHIPPED | OUTPATIENT
Start: 2024-10-04

## 2024-10-04 RX ORDER — POLYETHYLENE GLYCOL 3350 17 G/17G
17 POWDER, FOR SOLUTION ORAL DAILY PRN
Status: DISCONTINUED | OUTPATIENT
Start: 2024-10-04 | End: 2024-10-04 | Stop reason: HOSPADM

## 2024-10-04 RX ADMIN — ACETAMINOPHEN 975 MG: 325 TABLET ORAL at 05:15

## 2024-10-04 RX ADMIN — VALACYCLOVIR HYDROCHLORIDE 1000 MG: 500 TABLET, FILM COATED ORAL at 10:39

## 2024-10-04 RX ADMIN — ACETAMINOPHEN 975 MG: 325 TABLET ORAL at 12:17

## 2024-10-04 RX ADMIN — ACETAMINOPHEN 975 MG: 325 TABLET ORAL at 00:15

## 2024-10-04 NOTE — ASSESSMENT & PLAN NOTE
QBL: 388cc; admission Hb 12.0g/dL -> 9.1g/dL s/p venofer. Patient meeting postoperative milestones.  - Continue routine postoperative care  - Pain management with oral analgesics  - DVT Ppx: SCDs; encourage ambulation  - Void trial passed  - Encourage breastfeeding, familial bonding  - Contraception: postpartum IUD

## 2024-10-04 NOTE — LACTATION NOTE
This note was copied from a baby's chart.  CONSULT - LACTATION  Baby Girl (Efrain Rivas 3 days female MRN: [id number]    [facility] NURSERY Room / Bed: (N)/(N) Encounter: [id number]    Maternal Information     MOTHER:  Marybel Rivas  Maternal Age: 29 y.o.  OB History: # 1 - Date: , Sex: Female, Weight: None, GA: None, Type: Vaginal, Spontaneous, Apgar1: None, Apgar5: None, Living: , Birth Comments: None    # 2 - Date: 10/01/24, Sex: Female, Weight: 2935 g (6 lb 7.5 oz), GA: 38w2d, Type: , Low Transverse, Apgar1: 9, Apgar5: 9, Living: Living, Birth Comments: None   Previouse breast reduction surgery? No    Lactation history:   Has patient previously breast fed: Yes   How long had patient previously breast fed: 3 years with first child   Previous breast feeding complications: None     Past Surgical History:   Procedure Laterality Date    CO  DELIVERY ONLY N/A 10/1/2024    Procedure:  SECTION ();  Surgeon: Diomedes Hernandez MD;  Location: [facility] ;  Service: Obstetrics       Birth information:  YOB: 2023   Time of birth: 10:26 AM   Sex: female   Delivery type: , Low Transverse   Birth Weight: 2935 g (6 lb 7.5 oz)   Percent of Weight Change: -9%     Gestational Age: 38w2d   [unfilled]    Assessment     Breast and nipple assessment: normal assessment       10/04/24 0900   Lactation Consultation   Reason for Consult 20 minutes   Risk Factors Language barrier  ( # [id number])   Maternal Information   Has mother  before? Yes   How long did the the mother previously breastfeed? 3 years with first child   Previous Maternal Breastfeeding Challenges None   Infant to breast within first hour of birth? No   Breastfeeding Delayed Due to Maternal status   Breasts/Nipples   Left Breast Filling   Right Breast Filling   Left Nipple Everted   Right Nipple Everted   Intervention Hand expression    Breastfeeding Status Yes   Breastfeeding Progress Not yet established   Breast Pump   Pump 1  (hand pump)   Patient Follow-Up   Lactation Consult Status 2   Follow-Up Type Inpatient;Call as needed   Other OB Lactation Documentation    Additional Problem Noted Experienced BF mom states breastfeeding is going well. Reviewed cluster feedings and initial engorgement. Mom denies any questions or concerns. Enc mom to call for latch assessment.       Feeding recommendations:  breast feed on demand    Discussed 2nd night syndrome and ways to calm infant. Hand out given. Information on hand expression given. Discussed benefits of knowing how to manually express breast including stimulating milk supply, softening nipple for latch and evacuating breast in the event of engorgement.    Discussed s/s engorgement, blocked milk ducts, and mastitis. Discussed how to remedy at home and when to contact physician. Reviewed engorgement and ways to reduce symptoms. Use a warmth on breasts with massage prior to feeding / pumping. Use massage during pumping over full ducts. Used cold, compression on breasts after feeding/pumping session. Ideas are rice in a sock. Place one in the freezer for cool and one in the microwave for warmth. Referred to discharge book / engorgement page.    Encouraged parents to call for assistance, questions, and concerns about breastfeeding.  Extension provided.      Jennifer Montague MA 10/4/2024 9:12 AM

## 2024-10-04 NOTE — PROGRESS NOTES
"Progress Note - OB/GYN  Marybel Rivas 29 y.o. female MRN: [id number]  Unit/Bed#:  312-01 Encounter: [id number]    Assessment and Plan   Marybel Rivas is a patient of: [facility]. She is POD# 3 s/p primary  section, low vertical incision for HSV. Recovering well and stable.    * S/P primary low transverse   Assessment & Plan  QBL: 388cc; admission Hb 12.0g/dL -> 9.1g/dL s/p venofer. Patient meeting postoperative milestones.  - Continue routine postoperative care  - Pain management with oral analgesics  - DVT Ppx: SCDs; encourage ambulation  - Void trial passed  - Encourage breastfeeding, familial bonding  - Contraception: postpartum IUD    HSV (herpes simplex virus) infection  Assessment & Plan  - Valtrex 1000mg BID x10 days total      Disposition   - Anticipate discharge home on POD# 3    Subjective/Objective   Chief Complaint: POD#3 s/p primary  section, low transverse incision for HSv  Subjective:    Marybel Rivas has no current complaints. Pain is well controlled. She is currently voiding. She is currently passing flatus. She is ambulating. She is tolerating PO and denies nausea or vomitting. She denies chest pain, shortness of breath, lightheadedness. Lochia is normal. She is breastfeeding.    Vitals:   BP 99/57 (BP Location: Right arm)   Pulse 89   Temp 97.9 °F (36.6 °C) (Oral)   Resp 16   Ht 4' 10\" (1.473 m)   Wt 65.3 kg (144 lb)   LMP 2023 (Exact Date)   SpO2 97%   Breastfeeding Yes   BMI 30.10 kg/m²     No intake or output data in the 24 hours ending 10/04/24 0543    Invasive Devices       None                 Physical Exam:   GEN: well-appearing, alert and oriented x3  CARDIO: regular rate  RESP: nonlabored respirations on room air  ABDOMEN: soft, nontender, nodistended, fundus firm below the umbilicus, incision C/D/I    Labs:   Hemoglobin   Date Value Ref Range Status   10/02/2024 9.1 (L) 11.5 - 15.4 g/dL Final     Comment:     Results " verified by repeat   10/01/2024 12.0 11.5 - 15.4 g/dL Final     WBC   Date Value Ref Range Status   10/02/2024 10.43 (H) 4.31 - 10.16 Thousand/uL Final   10/01/2024 11.08 (H) 4.31 - 10.16 Thousand/uL Final     Platelets   Date Value Ref Range Status   10/02/2024 209 149 - 390 Thousands/uL Final   10/01/2024 264 149 - 390 Thousands/uL Final          Catherine Faith MD  OBGYN PGY-1  10/04/24  5:43 AM

## 2024-10-04 NOTE — PLAN OF CARE

## 2024-10-04 NOTE — PLAN OF CARE

## 2024-10-07 ENCOUNTER — OFFICE VISIT (OUTPATIENT)
Dept: OBGYN CLINIC | Facility: CLINIC | Age: 29
End: 2024-10-07

## 2024-10-07 ENCOUNTER — TELEPHONE (OUTPATIENT)
Dept: OBGYN CLINIC | Facility: CLINIC | Age: 29
End: 2024-10-07

## 2024-10-07 VITALS
BODY MASS INDEX: 27.5 KG/M2 | RESPIRATION RATE: 18 BRPM | SYSTOLIC BLOOD PRESSURE: 107 MMHG | WEIGHT: 131 LBS | HEIGHT: 58 IN | HEART RATE: 102 BPM | DIASTOLIC BLOOD PRESSURE: 67 MMHG

## 2024-10-07 DIAGNOSIS — R25.2 LEG CRAMPING: ICD-10-CM

## 2024-10-07 DIAGNOSIS — Z30.09 ENCOUNTER FOR COUNSELING REGARDING CONTRACEPTION: ICD-10-CM

## 2024-10-07 PROCEDURE — 99213 OFFICE O/P EST LOW 20 MIN: CPT | Performed by: OBSTETRICS & GYNECOLOGY

## 2024-10-07 PROCEDURE — 88307 TISSUE EXAM BY PATHOLOGIST: CPT | Performed by: PATHOLOGY

## 2024-10-07 RX ORDER — MAGNESIUM L-LACTATE 84 MG
84 TABLET, EXTENDED RELEASE ORAL DAILY
Qty: 30 TABLET | Refills: 0 | Status: SHIPPED | OUTPATIENT
Start: 2024-10-07

## 2024-10-07 NOTE — PROGRESS NOTES
OB/GYN  PN Visit  Marybel Rivas  [id number]  10/7/2024  12:05 PM  Dr. Hussain Crane MD    S: 29 y.o.  38w2d here for Postpartum incision check after primary LTCS due to HSV with active lesion. Reports improving abdominal pain at incision site and minimal vaginal bleeding. Patient is breast feeding without difficulty. Reports a few episodes of intermittent leg cramping occurring mostly at night    O:  Vitals:    10/07/24 1132   BP: 107/67   Pulse: 102   Resp: 18     Physical Exam  Constitutional:       General: She is not in acute distress.     Appearance: She is not ill-appearing.   HENT:      Mouth/Throat:      Mouth: Mucous membranes are moist.      Pharynx: Oropharynx is clear.   Eyes:      Conjunctiva/sclera: Conjunctivae normal.      Pupils: Pupils are equal, round, and reactive to light.   Cardiovascular:      Rate and Rhythm: Normal rate and regular rhythm.      Pulses: Normal pulses.      Heart sounds: Normal heart sounds.   Pulmonary:      Effort: Pulmonary effort is normal. No respiratory distress.      Breath sounds: Normal breath sounds.   Abdominal:      General: Bowel sounds are normal. There is no distension.      Palpations: Abdomen is soft.      Tenderness: There is no abdominal tenderness.      Comments: Incision site clean dry intact   Musculoskeletal:         General: No swelling or tenderness.      Right lower leg: No edema.      Left lower leg: No edema.      Comments: Negative carisa's   Skin:     General: Skin is warm and dry.      Capillary Refill: Capillary refill takes less than 2 seconds.   Neurological:      General: No focal deficit present.      Mental Status: She is alert and oriented to person, place, and time.      Motor: No weakness.   Psychiatric:         Mood and Affect: Mood normal.       A/P:  1. 38w2d GESTATION  - Continue PNV  - Educated patient on magnesium supplementation and increased hydration to help leg cramping  - Flu Shot: declines  - Delivery: LTCS, advised  patient to keep area clean and pat dry after showering  - Contraception: discussed POP vs Depo until placement of preferred Mirena IUD  - RTO in 2 weeks    Problem List       S/P primary low transverse  (Chronic)    Third trimester pregnancy    Language barrier    Abnormal glucose tolerance in pregnancy    Vertex presentation of fetus    Overview     Recheck presentation at 36 wks         Decreased fetal movement    HSV (herpes simplex virus) infection    Overview     24 HSV culture done on Rt vulva, started on  Valtrex, await result, if positive would need Primary C/S          Hussain Crane MD  10/7/2024  12:05 PM

## 2024-10-07 NOTE — TELEPHONE ENCOUNTER
Unable to LVM - mailbox is not set-up. If pt calls the office please complete TCM and schedule incision check on or after 10/8 and PPD appt on or after 10/22

## 2024-10-21 ENCOUNTER — PATIENT OUTREACH (OUTPATIENT)
Dept: OBGYN CLINIC | Facility: CLINIC | Age: 29
End: 2024-10-21

## 2024-10-21 NOTE — PROGRESS NOTES
RICHELLE RIOS used  service ID [id number] to contact pt. Pt did not answer and does not have a voicemail available. RICHELLE RIOS was unable to leave a voicemail. RICHELLE RIOS will try again at another time.

## 2024-10-22 ENCOUNTER — PATIENT OUTREACH (OUTPATIENT)
Dept: OBGYN CLINIC | Facility: CLINIC | Age: 29
End: 2024-10-22

## 2024-10-22 NOTE — PROGRESS NOTES
RICHELLE RIOS contacted pt using  service ID [id number].     Pt had her baby on 10/01/24. Pt reports to be emotionally feeling very happy and having emotional support. Pt reports feeling connected with her baby and denies MH concerns. Pt reports having all supplies for the baby. RICHELLE RIOS inquired about additional concerns or support. Pt gave thanks to RICHELLE RIOS for ongoing support in her pregnancy. Pt denied additional need and reported that she will contact [facility] or RICHELLE RIOS for support if needed in the future. RICHELLE RIOS will otherwise close this referral a this time.

## 2024-10-24 ENCOUNTER — POSTPARTUM VISIT (OUTPATIENT)
Dept: OBGYN CLINIC | Facility: CLINIC | Age: 29
End: 2024-10-24

## 2024-10-24 VITALS
DIASTOLIC BLOOD PRESSURE: 60 MMHG | BODY MASS INDEX: 26.87 KG/M2 | RESPIRATION RATE: 18 BRPM | SYSTOLIC BLOOD PRESSURE: 116 MMHG | WEIGHT: 128 LBS | HEIGHT: 58 IN

## 2024-10-24 DIAGNOSIS — Z98.891 S/P PRIMARY LOW TRANSVERSE C-SECTION: Primary | Chronic | ICD-10-CM

## 2024-10-24 PROCEDURE — 99213 OFFICE O/P EST LOW 20 MIN: CPT | Performed by: OBSTETRICS & GYNECOLOGY

## 2024-10-24 NOTE — PROGRESS NOTES
Assessment & Plan     Normal postpartum exam.     1. Contraception: IUD, RTC in 3-4 weeks for Mirena placement from floor stock  2. Low breast milk supply: lactation consult, [facility] information discussed.  3. Increase activity as tolerated, may resume all normal activity.  4. Next pap smear due .      Lalita Rivas is a 29 y.o. female who presents for a postpartum visit. Her pregnancy was complicated by active herpetic lesions prompting delivery via low transverse . She delivered via CS on 10/1/24, 6 lb 7.5oz, female . Her delivery course was herpes for which she has completed a course of Valtrex. Denies any symptoms or lesions she has noted.    Bleeding no bleeding. Bowel function is normal. Bladder function is normal. Patient not been sexually active. Desired contraception method is IUD.     Postpartum depression screening: negative. EPDS : 7    Baby's course has been normal.   Baby is feeding by both breast and bottle because she has had low supply.    Last Pap : 2024   Gestational Diabetes: no      Current Outpatient Medications:     acetaminophen (TYLENOL) 325 mg tablet, Take 3 tablets (975 mg total) by mouth every 6 (six) hours, Disp: 30 tablet, Rfl: 0    Prenatal Vit-Fe Fumarate-FA (Prenatal Plus Vitamin/Mineral) 27-1 MG TABS, Take 1 tablet by mouth in the morning, Disp: 90 tablet, Rfl: 3    benzocaine-menthol-lanolin-aloe (DERMOPLAST) 20-0.5 % topical spray, Apply 1 Application topically every 6 (six) hours as needed for mild pain or irritation (Patient not taking: Reported on 10/7/2024), Disp: , Rfl:     hydrocortisone 1 % cream, Apply 1 Application topically daily as needed for irritation (Patient not taking: Reported on 10/7/2024), Disp: , Rfl:     ibuprofen (MOTRIN) 600 mg tablet, Take 1 tablet (600 mg total) by mouth every 6 (six) hours as needed for mild pain, headaches or fever (Patient not taking: Reported on 10/7/2024), Disp: 30 tablet, Rfl: 0     "magnesium (MAGTAB) 84 MG (7MEQ) TBCR, Take 1 tablet (84 mg total) by mouth daily (Patient not taking: Reported on 10/24/2024), Disp: 30 tablet, Rfl: 0    polyethylene glycol (MIRALAX) 17 g packet, Take 17 g by mouth daily as needed (Constipation) (Patient not taking: Reported on 10/7/2024), Disp: , Rfl:     valACYclovir (VALTREX) 1,000 mg tablet, Take 1 tablet (1,000 mg total) by mouth 2 (two) times a day for 10 days, Disp: 20 tablet, Rfl: 0    witch hazel-glycerin (TUCKS) topical pad, Apply 1 Pad topically every 4 (four) hours as needed for irritation (Patient not taking: Reported on 10/7/2024), Disp: , Rfl:     No Known Allergies    Review of Systems  Constitutional: no fever, feels well  Breasts: no complaints of breast pain, breast lump, or nipple discharge  Gastrointestinal: no complaints nausea, vomiting  Genitourinary: as noted in HPI.  Neurological: no complaints of headache    Objective      /60 (BP Location: Right arm, Patient Position: Sitting, Cuff Size: Adult)   Resp 18   Ht 4' 10\" (1.473 m)   Wt 58.1 kg (128 lb)   LMP 12/29/2023 (Exact Date)   Breastfeeding Yes   BMI 26.75 kg/m²     Physical Exam  Constitutional:       Appearance: She is normal weight.   Genitourinary:      Vulva normal.      Genitourinary Comments: No herpetic lesions visualized   Cardiovascular:      Rate and Rhythm: Normal rate.      Pulses: Normal pulses.   Pulmonary:      Effort: Pulmonary effort is normal.   Abdominal:      Palpations: Abdomen is soft.      Tenderness: There is no abdominal tenderness.      Comments: Well-healing. Pfannenstiel incision with Exofin. Clean/dry/intact   Neurological:      Mental Status: She is alert and oriented to person, place, and time.   Skin:     General: Skin is warm.   Psychiatric:         Mood and Affect: Mood normal.        Olga Snider MD  PGY-4  10/24/2024  9:27 AM    "

## 2024-10-25 ENCOUNTER — TELEPHONE (OUTPATIENT)
Age: 29
End: 2024-10-25

## 2024-11-21 ENCOUNTER — PROCEDURE VISIT (OUTPATIENT)
Dept: OBGYN CLINIC | Facility: CLINIC | Age: 29
End: 2024-11-21

## 2024-11-21 VITALS
RESPIRATION RATE: 18 BRPM | HEART RATE: 71 BPM | BODY MASS INDEX: 27.92 KG/M2 | HEIGHT: 58 IN | DIASTOLIC BLOOD PRESSURE: 70 MMHG | SYSTOLIC BLOOD PRESSURE: 107 MMHG | WEIGHT: 133 LBS

## 2024-11-21 DIAGNOSIS — Z32.02 PREGNANCY TEST NEGATIVE: Primary | ICD-10-CM

## 2024-11-21 DIAGNOSIS — Z97.5 CONTRACEPTION, DEVICE INTRAUTERINE: ICD-10-CM

## 2024-11-21 LAB — SL AMB POCT URINE HCG: NORMAL

## 2024-11-21 PROCEDURE — 81025 URINE PREGNANCY TEST: CPT | Performed by: OBSTETRICS & GYNECOLOGY

## 2024-11-21 PROCEDURE — 58300 INSERT INTRAUTERINE DEVICE: CPT | Performed by: OBSTETRICS & GYNECOLOGY

## 2024-11-21 NOTE — PROGRESS NOTES
IUD Procedure    Date/Time: 11/21/2024 9:00 AM    Performed by: Shantell Hogue MD  Authorized by: Blossom Aviles MD    Other Assisting Provider: Yes (comment) (Jen James MS3 interpretting)    Verbal consent obtained?: Yes    Written consent obtained?: Yes    Risks and benefits: Risks, benefits and alternatives were discussed    Consent given by:  Patient  Time Out:     Time out: Immediately prior to the procedure a time out was called      Time out performed at:  11/21/2024 8:55 AM  Patient states understanding of procedure being performed: Yes    Patient's understanding of procedure matches consent: Yes    Procedure consent matches procedure scheduled: Yes    Relevant documents present and verified: Yes    Test results available and properly labeled: Yes    Required items: Required blood products, implants, devices and special equipment available    Patient identity confirmed:  Verbally with patient  Select procedure: IUD insertion    IUD Insertion:     Pelvic exam performed: yes      Negative GC/chlamydia test: yes (4/4/24)      Negative urine pregnancy test: yes (11/21/2024)      Negative serum pregnancy test: no      Cervix cleaned and prepped: yes      Speculum placed in vagina: yes      Tenaculum applied to cervix: no      Allis applied to cervix: no      IUD inserted with no complications: yes      Strings trimmed: yes      Uterus sounded: yes      Uterus sound depth (cm):  7    IUD type:  1 each Levonorgestrel 20 MCG/DAY  Post-procedure:     Patient tolerated procedure well: yes      Patient will follow up after next period: yes    Insertion Comments:      Patient tolerated the procedure well with no complications. Advised to not have sex for one week. Ibuprofen and heat recommended for cramping. RTC in 2 weeks for string check

## 2024-12-05 ENCOUNTER — OFFICE VISIT (OUTPATIENT)
Dept: OBGYN CLINIC | Facility: CLINIC | Age: 29
End: 2024-12-05

## 2024-12-05 VITALS
RESPIRATION RATE: 18 BRPM | BODY MASS INDEX: 28.38 KG/M2 | HEART RATE: 74 BPM | DIASTOLIC BLOOD PRESSURE: 70 MMHG | WEIGHT: 135.2 LBS | HEIGHT: 58 IN | SYSTOLIC BLOOD PRESSURE: 105 MMHG

## 2024-12-05 DIAGNOSIS — Z30.431 IUD CHECK UP: Primary | ICD-10-CM

## 2024-12-05 PROCEDURE — 99213 OFFICE O/P EST LOW 20 MIN: CPT | Performed by: OBSTETRICS & GYNECOLOGY

## 2024-12-05 NOTE — PROGRESS NOTES
"OB/GYN VISIT  Marybel Rivas  2024  9:00 AM    ASSESSMENT / PLAN:    Marybel Rivas is a 29 y.o.  female presenting for string check after IUD placement on 24. IUD string visible through cervical os. Patient reports no issues with pain or bleeding    RTC in October for annual visit      SUBJECTIVE:    Marybel Rivas is a 29 y.o.  female presenting for IUD string check after placement 24. She reports no problems with bleeding or pain. She is happy with her IUD at this time. Discussed expectations of menses moving forward with IUD. Discussed possibility of irregular bleeding for the first few months. Discussed that some people do not get a period at all, some have lighter periods, and some maintain menses as they had before. Encouraged patient to call or return sooner if she has any issues or concerns.     History reviewed. No pertinent past medical history.    Past Surgical History:   Procedure Laterality Date    NV  DELIVERY ONLY N/A 10/1/2024    Procedure:  SECTION ();  Surgeon: Diomedes Hernandez MD;  Location: [facility] ;  Service: Obstetrics       OBJECTIVE:    Vitals:  Blood pressure 105/70, pulse 74, resp. rate 18, height 4' 10\" (1.473 m), weight 61.3 kg (135 lb 3.2 oz), last menstrual period 11/15/2024, currently breastfeeding.Body mass index is 28.26 kg/m².    Physical Exam:    Physical Exam  Constitutional:       Appearance: Normal appearance.   Genitourinary:      Genitourinary Comments: Speculum exam: normal appearing vulva, vagina, cervix without lesions or lacerations. No abnormal discharge or bleeding. Ectropion noted. IUD strings visualized passing through cervical os, ~2 cm in length   HENT:      Head: Normocephalic and atraumatic.      Mouth/Throat:      Mouth: Mucous membranes are moist.      Pharynx: Oropharynx is clear.   Eyes:      General: No scleral icterus.     Extraocular Movements: Extraocular movements intact.   Cardiovascular:      Rate " and Rhythm: Normal rate and regular rhythm.      Pulses: Normal pulses.   Pulmonary:      Effort: Pulmonary effort is normal. No respiratory distress.   Abdominal:      Palpations: Abdomen is soft.      Tenderness: There is no abdominal tenderness.   Neurological:      Mental Status: She is alert.   Vitals reviewed. Exam conducted with a chaperone present.               Shantell Hogue MD  12/5/2024  9:00 AM

## 2024-12-17 NOTE — ASSESSMENT & PLAN NOTE
Complains of DFM, irregular contractions, pink discharge  SVE in office 1.5/50/-3  No bleeding on speculum exam  Sent patient to triage for evaluation  GBS collected   Yes

## 2025-01-01 NOTE — TELEPHONE ENCOUNTER
ID [id number]    Attempted to call, could not leave a message as the voicemail was not set up. Patient does not have my chart, will attempt to call again at a later time.    Sickle cell pain crisis

## (undated) DEVICE — PACK C-SECTION PBDS

## (undated) DEVICE — SUT MONOCRYL 4-0 PS-2 27 IN Y426H

## (undated) DEVICE — CHLORAPREP HI-LITE 26ML ORANGE

## (undated) DEVICE — MEDI-VAC YANKAUER SUCTION HANDLE W/STRAIGHT TIP & CONTROL VENT: Brand: CARDINAL HEALTH

## (undated) DEVICE — SKIN MARKER DUAL TIP WITH RULER CAP, FLEXIBLE RULER AND LABELS: Brand: DEVON

## (undated) DEVICE — SUT VICRYL 0 CT-1 27 IN J260H

## (undated) DEVICE — TELFA NON-ADHERENT ABSORBENT DRESSING: Brand: TELFA

## (undated) DEVICE — Device

## (undated) DEVICE — ABDOMINAL PAD: Brand: DERMACEA

## (undated) DEVICE — EXOFIN PRECISION PEN HIGH VISCOSITY TOPICAL SKIN ADHESIVE: Brand: EXOFIN PRECISION PEN, 1G

## (undated) DEVICE — SUT MONOCRYL 0 CTX 36 IN Y398H

## (undated) DEVICE — GLOVE SRG BIOGEL ECLIPSE 8

## (undated) DEVICE — GAUZE SPONGES,16 PLY: Brand: CURITY

## (undated) DEVICE — SUT VICRYL 4-0 KS 27 IN J662H